# Patient Record
Sex: FEMALE | Race: WHITE | Employment: OTHER | URBAN - METROPOLITAN AREA
[De-identification: names, ages, dates, MRNs, and addresses within clinical notes are randomized per-mention and may not be internally consistent; named-entity substitution may affect disease eponyms.]

---

## 2018-09-10 PROBLEM — M50.30 DDD (DEGENERATIVE DISC DISEASE), CERVICAL: Status: ACTIVE | Noted: 2018-09-10

## 2018-09-10 PROBLEM — M48.02 CERVICAL SPINAL STENOSIS: Status: ACTIVE | Noted: 2018-09-10

## 2018-09-10 PROBLEM — M50.20 HNP (HERNIATED NUCLEUS PULPOSUS), CERVICAL: Status: ACTIVE | Noted: 2018-09-10

## 2018-09-11 ENCOUNTER — HOSPITAL ENCOUNTER (OUTPATIENT)
Dept: PREADMISSION TESTING | Age: 63
Discharge: HOME | End: 2018-09-11
Payer: MEDICARE

## 2018-09-11 VITALS — WEIGHT: 175 LBS | HEIGHT: 67 IN | BODY MASS INDEX: 27.47 KG/M2

## 2018-09-11 LAB
ALBUMIN SERPL-MCNC: 3.6 G/DL (ref 3.4–5)
ALBUMIN/GLOB SERPL: 1.2 {RATIO} (ref 0.8–1.7)
ALP SERPL-CCNC: 55 U/L (ref 45–117)
ALT SERPL-CCNC: 21 U/L (ref 13–56)
ANION GAP SERPL CALC-SCNC: 6 MMOL/L (ref 3–18)
AST SERPL-CCNC: 16 U/L (ref 15–37)
ATRIAL RATE: 83 BPM
BACTERIA SPEC CULT: NORMAL
BILIRUB SERPL-MCNC: 0.3 MG/DL (ref 0.2–1)
BUN SERPL-MCNC: 20 MG/DL (ref 7–18)
BUN/CREAT SERPL: 25 (ref 12–20)
CALCIUM SERPL-MCNC: 9.1 MG/DL (ref 8.5–10.1)
CALCULATED P AXIS, ECG09: 64 DEGREES
CALCULATED R AXIS, ECG10: -13 DEGREES
CALCULATED T AXIS, ECG11: 51 DEGREES
CHLORIDE SERPL-SCNC: 104 MMOL/L (ref 100–108)
CO2 SERPL-SCNC: 32 MMOL/L (ref 21–32)
CREAT SERPL-MCNC: 0.81 MG/DL (ref 0.6–1.3)
DIAGNOSIS, 93000: NORMAL
ERYTHROCYTE [DISTWIDTH] IN BLOOD BY AUTOMATED COUNT: 13.7 % (ref 11.6–14.5)
GLOBULIN SER CALC-MCNC: 3.1 G/DL (ref 2–4)
GLUCOSE SERPL-MCNC: 68 MG/DL (ref 74–99)
HCT VFR BLD AUTO: 41.2 % (ref 35–45)
HGB BLD-MCNC: 13.7 G/DL (ref 12–16)
MCH RBC QN AUTO: 30.8 PG (ref 24–34)
MCHC RBC AUTO-ENTMCNC: 33.3 G/DL (ref 31–37)
MCV RBC AUTO: 92.6 FL (ref 74–97)
P-R INTERVAL, ECG05: 192 MS
PLATELET # BLD AUTO: 229 K/UL (ref 135–420)
PMV BLD AUTO: 10.5 FL (ref 9.2–11.8)
POTASSIUM SERPL-SCNC: 4.2 MMOL/L (ref 3.5–5.5)
PROT SERPL-MCNC: 6.7 G/DL (ref 6.4–8.2)
Q-T INTERVAL, ECG07: 360 MS
QRS DURATION, ECG06: 74 MS
QTC CALCULATION (BEZET), ECG08: 423 MS
RBC # BLD AUTO: 4.45 M/UL (ref 4.2–5.3)
SERVICE CMNT-IMP: NORMAL
SODIUM SERPL-SCNC: 142 MMOL/L (ref 136–145)
VENTRICULAR RATE, ECG03: 83 BPM
WBC # BLD AUTO: 5 K/UL (ref 4.6–13.2)

## 2018-09-11 PROCEDURE — 87641 MR-STAPH DNA AMP PROBE: CPT | Performed by: ORTHOPAEDIC SURGERY

## 2018-09-11 PROCEDURE — 93005 ELECTROCARDIOGRAM TRACING: CPT

## 2018-09-11 PROCEDURE — 80053 COMPREHEN METABOLIC PANEL: CPT | Performed by: ORTHOPAEDIC SURGERY

## 2018-09-11 PROCEDURE — 85027 COMPLETE CBC AUTOMATED: CPT | Performed by: ORTHOPAEDIC SURGERY

## 2018-09-11 RX ORDER — NAPROXEN 500 MG/1
500 TABLET ORAL AS NEEDED
COMMUNITY
End: 2018-09-19

## 2018-09-11 RX ORDER — CEFAZOLIN SODIUM/WATER 2 G/20 ML
2 SYRINGE (ML) INTRAVENOUS ONCE
Status: CANCELLED | OUTPATIENT
Start: 2018-09-11 | End: 2018-09-11

## 2018-09-11 RX ORDER — BISMUTH SUBSALICYLATE 262 MG
1 TABLET,CHEWABLE ORAL DAILY
COMMUNITY

## 2018-09-11 RX ORDER — LEVOTHYROXINE SODIUM 125 UG/1
125 TABLET ORAL
COMMUNITY
End: 2018-09-11

## 2018-09-11 RX ORDER — LEVOTHYROXINE SODIUM 125 UG/1
125 TABLET ORAL
COMMUNITY

## 2018-09-11 RX ORDER — LEVOCETIRIZINE DIHYDROCHLORIDE 5 MG/1
5 TABLET, FILM COATED ORAL DAILY
COMMUNITY

## 2018-09-11 RX ORDER — RIZATRIPTAN BENZOATE 10 MG/1
10 TABLET ORAL
COMMUNITY

## 2018-09-11 NOTE — H&P
Patient Name:  Jahaira Briggs YOB: 1955 Chief Complaint:  Left-sided mid-back pain. History of Chief Complaint:  Ms. Brian Tan is a 57-year-old female who is being seen for left-sided mid-back pain. She has left upper extremity numbness that goes into the left long finger. Her symptoms began suddenly three months ago and have been worse over the past month. The pain is constant. She is right hand dominant. She has decreased left hand  strength and has been dropping items and feels uncoordinated with her left hand. There is no bowel or bladder dysfunction and no problems with her balance. The pain is worse with standing, and it is better with heat, rest, and lying down. She has been wearing a cervical collar for three days per Dr. Les Hayes. She has had four sessions of physical therapy. She has had a steroid injection. She has had two Medrol Dosepaks which helped with her neck pain. She has also taken Mobic, Flexeril, and gabapentin. She had an MRI scan done at [facility] on 08/20/18. She had a DEXA scan done five years ago which was normal.  
 
Past Medical/Surgical History:   
Disease/Disorder Type Date Side Surgery Date Side Comment Arthritis Depression Spinal stenosis Thyroid disease Headache, migraine Cholecystectomy 2000 Allergies:   
Ingredient Reaction Medication Name Comment NO KNOWN ALLERGIES Current Medications:   
Medication Directions  
levothyroxine 125 mcg tablet   
gabapentin   
naproxen 500 mg tablet   
rizatriptan 10 mg tablet Social History: SMOKING Status Tobacco Type Units Per Day Yrs Used Never smoker ALCOHOL There is a history of alcohol use. Type: Wine. 1 glass consumed daily. Family History:   
Disease Detail Family Member Age Cause of Death Comments Family history of Arthritis Family history of Heart disease Review of Systems:    Pertinent positives include headache, muscle weakness and numbness/tingling. Pertinent negatives include blurred vision, chest pain, chills, cold, discharge of the eyes, dizziness, double vision, fever, hearing loss, heart murmur, itching of the eyes, palpitations, redness of the eyes, rheumatic fever, ringing in ears, sore throat/hoarseness, weight change, abdominal pain, anxiety, bipolar disorder, bladder/kidney infection, bloody stool, blood in urine, burning sensation, changes in mood, chronic cough, depression, diarrhea, difficulty breathing, difficulty swallowing, fainting, frequent urinating, fracture/dislocation, gas/bloating, gout, hemorrhoids, incontinence, joint pain, joint stiffness, loss of balance, memory loss, nausea/vomiting, pain on breathing, painful urination, psoriasis, rash/itching, Raynaud's phenomenon, rheumatoid disease, seizure disorder, shortness of breath, sprain/strain, swelling of feet, tendonitis, varicose veins and wheezing. Vitals: 
Date BP Pulse Temp (F) Resp. (per min.) Height (Total in.) Weight (lbs.) BMI  
08/31/2018     66.00 167.00 26.95 Physical Examination:   
General:  The patient appears healthy. Cardiovascular:  Arterial pulses are normal. 
Skin:  The skin is normal appearing with no contusions or wounds noted. Heart- RRR Lungs-CTA kiki Abdomen- +BS,soft,nontender Musculoskeletal:  The cervical spine has a normal appearance, no tenderness to palpation, and no spasm of the paracervical muscle. There is tenderness around the left trapezius muscle. Flexion, extension, and right and left rotation of the cervical spine are normal.  Examination of the shoulder shows no warmth, no deformity, and no muscle atrophy. There is no tenderness on palpation of the subacromial bursa, the glenohumeral joint region, or the bicipital groove.   Range of motion of the shoulder is normal in abduction, forward flexion, extension, and in internal and external rotation. No pain is elicited by motion of the shoulder or by impingement testing. No instability of the shoulder is noted. Neurological:  She has 4/5 left biceps. She has a positive Lhermitte sign. Sensory and motor examination of the cervical spine elicited no tactile dysesthesia or hyperesthesia. Shoulder strength is normal in flexion, abduction, adduction, and internal rotation. Reflexes and peripheral nerves are intact. Normal reflexes are present in the biceps, brachioradialis, and triceps. There is no weakness in the fingers. Gait and stance are normal.  Knee and ankle jerks are normal with no clonus. Radiograph Examination:  AP, lateral, bilateral oblique, flexion and extension views of the cervical spine were obtained and interpreted in the office 8/31/18 and reveal C4 to T1 degenerative disc disease with 1 mm spondylolisthesis of C7 on T1. Review of her MRI scan [facility] 8/20/18 reveals severe spinal stenosis and degenerative disc disease at C4 through C7 with spondylolisthesis of C7 on T1, without neurologic impingement. Plan:  Ms. Dami Urbina, her , and I had a long discussion about the treatments for her cervical spinal stenosis and degenerative disc disease including surgical intervention, the risks, and benefits as well as the different surgical approaches and decision making. We also discussed nonsurgical care for this condition including medications, injections, physical therapy, rehabilitation, activity modification, and brace utilization. At this point, she would like to proceed with operative intervention. We will plan for anterior cervical discectomy and fusion at C4 to C7. We did discuss leaving C7-T1 alone given the minimal listhesis without neurologic impingement. She may need surgical intervention at that level in the future. The risks versus the benefits as well as the alternatives were fully explained to the patient.   Risks include, but are not limited to, paralysis, death, heart attack, stroke, pulmonary embolism, deep vein thrombosis, infection, failure to relieve pain, increase in back or arm pain, reherniation, scarring, spinal fluid leak, bowel or bladder dysfunction, bleeding, disease transmission, instrumentation failure, pseudarthrosis, difficulty swallowing, and need for revision surgery. The patient states full understanding of the risks and benefits and wishes to proceed. Admitting as inpatient acknowledging increased risk of anesthesia and need for post-operative monitoring of  respiratory distress, and  cardiac stress related to conditions with advanced age and multiple level fusion.

## 2018-09-17 ENCOUNTER — ANESTHESIA EVENT (OUTPATIENT)
Dept: SURGERY | Age: 63
End: 2018-09-17
Payer: MEDICARE

## 2018-09-18 ENCOUNTER — APPOINTMENT (OUTPATIENT)
Dept: GENERAL RADIOLOGY | Age: 63
End: 2018-09-18
Attending: ORTHOPAEDIC SURGERY
Payer: MEDICARE

## 2018-09-18 ENCOUNTER — HOSPITAL ENCOUNTER (OUTPATIENT)
Age: 63
Discharge: HOME HEALTH | End: 2018-09-19
Attending: ORTHOPAEDIC SURGERY | Admitting: ORTHOPAEDIC SURGERY
Payer: MEDICARE

## 2018-09-18 ENCOUNTER — ANESTHESIA (OUTPATIENT)
Dept: SURGERY | Age: 63
End: 2018-09-18
Payer: MEDICARE

## 2018-09-18 DIAGNOSIS — M48.02 CERVICAL SPINAL STENOSIS: Primary | ICD-10-CM

## 2018-09-18 DIAGNOSIS — M50.20 HNP (HERNIATED NUCLEUS PULPOSUS), CERVICAL: ICD-10-CM

## 2018-09-18 DIAGNOSIS — M50.30 DDD (DEGENERATIVE DISC DISEASE), CERVICAL: ICD-10-CM

## 2018-09-18 LAB
ABO + RH BLD: NORMAL
BLOOD GROUP ANTIBODIES SERPL: NORMAL
GLUCOSE BLD STRIP.AUTO-MCNC: 136 MG/DL (ref 70–110)
SPECIMEN EXP DATE BLD: NORMAL

## 2018-09-18 PROCEDURE — 94400 HC END TIDAL CO2 RESPONSE CURVE: CPT

## 2018-09-18 PROCEDURE — 77030038020 HC MANFLD NEPTUNE STRY -B: Performed by: ORTHOPAEDIC SURGERY

## 2018-09-18 PROCEDURE — 65390000012 HC CONDITION CODE 44 OBSERVATION

## 2018-09-18 PROCEDURE — 77030020782 HC GWN BAIR PAWS FLX 3M -B: Performed by: ORTHOPAEDIC SURGERY

## 2018-09-18 PROCEDURE — 77030008477 HC STYL SATN SLP COVD -A: Performed by: NURSE ANESTHETIST, CERTIFIED REGISTERED

## 2018-09-18 PROCEDURE — 74011000250 HC RX REV CODE- 250

## 2018-09-18 PROCEDURE — 76010000153 HC OR TIME 1.5 TO 2 HR: Performed by: ORTHOPAEDIC SURGERY

## 2018-09-18 PROCEDURE — 74011250637 HC RX REV CODE- 250/637: Performed by: PHYSICIAN ASSISTANT

## 2018-09-18 PROCEDURE — 77030036881: Performed by: ORTHOPAEDIC SURGERY

## 2018-09-18 PROCEDURE — 86900 BLOOD TYPING SEROLOGIC ABO: CPT | Performed by: ANESTHESIOLOGY

## 2018-09-18 PROCEDURE — 77030002986 HC SUT PROL J&J -A: Performed by: ORTHOPAEDIC SURGERY

## 2018-09-18 PROCEDURE — 77030003029 HC SUT VCRL J&J -B: Performed by: ORTHOPAEDIC SURGERY

## 2018-09-18 PROCEDURE — 77030013567 HC DRN WND RESERV BARD -A: Performed by: ORTHOPAEDIC SURGERY

## 2018-09-18 PROCEDURE — 74011250636 HC RX REV CODE- 250/636: Performed by: PHYSICIAN ASSISTANT

## 2018-09-18 PROCEDURE — 76210000016 HC OR PH I REC 1 TO 1.5 HR: Performed by: ORTHOPAEDIC SURGERY

## 2018-09-18 PROCEDURE — 74011250636 HC RX REV CODE- 250/636: Performed by: ORTHOPAEDIC SURGERY

## 2018-09-18 PROCEDURE — 74011250636 HC RX REV CODE- 250/636

## 2018-09-18 PROCEDURE — 77030008683 HC TU ET CUF COVD -A: Performed by: NURSE ANESTHETIST, CERTIFIED REGISTERED

## 2018-09-18 PROCEDURE — 77030006643: Performed by: NURSE ANESTHETIST, CERTIFIED REGISTERED

## 2018-09-18 PROCEDURE — C1713 ANCHOR/SCREW BN/BN,TIS/BN: HCPCS | Performed by: ORTHOPAEDIC SURGERY

## 2018-09-18 PROCEDURE — 74011250636 HC RX REV CODE- 250/636: Performed by: ANESTHESIOLOGY

## 2018-09-18 PROCEDURE — 77030032490 HC SLV COMPR SCD KNE COVD -B: Performed by: ORTHOPAEDIC SURGERY

## 2018-09-18 PROCEDURE — 77030018836 HC SOL IRR NACL ICUM -A: Performed by: ORTHOPAEDIC SURGERY

## 2018-09-18 PROCEDURE — 77030012406 HC DRN WND PENRS BARD -A: Performed by: ORTHOPAEDIC SURGERY

## 2018-09-18 PROCEDURE — 77030039266 HC ADH SKN EXOFIN S2SG -A: Performed by: ORTHOPAEDIC SURGERY

## 2018-09-18 PROCEDURE — 74011000250 HC RX REV CODE- 250: Performed by: ORTHOPAEDIC SURGERY

## 2018-09-18 PROCEDURE — 82962 GLUCOSE BLOOD TEST: CPT

## 2018-09-18 PROCEDURE — 77010033678 HC OXYGEN DAILY

## 2018-09-18 PROCEDURE — 77030004402 HC BUR NEUR STRY -C: Performed by: ORTHOPAEDIC SURGERY

## 2018-09-18 PROCEDURE — 77030011267 HC ELECTRD BLD COVD -A: Performed by: ORTHOPAEDIC SURGERY

## 2018-09-18 PROCEDURE — 65270000029 HC RM PRIVATE

## 2018-09-18 PROCEDURE — 36415 COLL VENOUS BLD VENIPUNCTURE: CPT | Performed by: ANESTHESIOLOGY

## 2018-09-18 PROCEDURE — 76060000034 HC ANESTHESIA 1.5 TO 2 HR: Performed by: ORTHOPAEDIC SURGERY

## 2018-09-18 PROCEDURE — 77030008462 HC STPLR SKN PROX J&J -A: Performed by: ORTHOPAEDIC SURGERY

## 2018-09-18 DEVICE — IMPLANTABLE DEVICE: Type: IMPLANTABLE DEVICE | Site: SPINE CERVICAL | Status: FUNCTIONAL

## 2018-09-18 DEVICE — GRAFT SPNL SPCR W145XH7XL12MM 7DEG CERV LORDOSIS PRESERVON: Type: IMPLANTABLE DEVICE | Site: SPINE CERVICAL | Status: FUNCTIONAL

## 2018-09-18 DEVICE — GRAFT SPNL SPCR W145XH8XL12MM 7DEG CERV LORDOSIS PRESERVON: Type: IMPLANTABLE DEVICE | Site: SPINE CERVICAL | Status: FUNCTIONAL

## 2018-09-18 DEVICE — SCREW SPNL 4.2X14MM SELF DRL INVUE: Type: IMPLANTABLE DEVICE | Site: SPINE CERVICAL | Status: FUNCTIONAL

## 2018-09-18 RX ORDER — SODIUM CHLORIDE 0.9 % (FLUSH) 0.9 %
5-10 SYRINGE (ML) INJECTION AS NEEDED
Status: DISCONTINUED | OUTPATIENT
Start: 2018-09-18 | End: 2018-09-19 | Stop reason: HOSPADM

## 2018-09-18 RX ORDER — ACETAMINOPHEN 10 MG/ML
1000 INJECTION, SOLUTION INTRAVENOUS ONCE
Status: COMPLETED | OUTPATIENT
Start: 2018-09-18 | End: 2018-09-18

## 2018-09-18 RX ORDER — SODIUM CHLORIDE, SODIUM LACTATE, POTASSIUM CHLORIDE, CALCIUM CHLORIDE 600; 310; 30; 20 MG/100ML; MG/100ML; MG/100ML; MG/100ML
1000 INJECTION, SOLUTION INTRAVENOUS CONTINUOUS
Status: DISCONTINUED | OUTPATIENT
Start: 2018-09-18 | End: 2018-09-18 | Stop reason: HOSPADM

## 2018-09-18 RX ORDER — HYDROMORPHONE HYDROCHLORIDE 2 MG/ML
0.5 INJECTION, SOLUTION INTRAMUSCULAR; INTRAVENOUS; SUBCUTANEOUS
Status: DISCONTINUED | OUTPATIENT
Start: 2018-09-18 | End: 2018-09-18 | Stop reason: HOSPADM

## 2018-09-18 RX ORDER — DEXAMETHASONE SODIUM PHOSPHATE 4 MG/ML
INJECTION, SOLUTION INTRA-ARTICULAR; INTRALESIONAL; INTRAMUSCULAR; INTRAVENOUS; SOFT TISSUE AS NEEDED
Status: DISCONTINUED | OUTPATIENT
Start: 2018-09-18 | End: 2018-09-18 | Stop reason: HOSPADM

## 2018-09-18 RX ORDER — RIZATRIPTAN BENZOATE 5 MG/1
10 TABLET, ORALLY DISINTEGRATING ORAL
Status: DISCONTINUED | OUTPATIENT
Start: 2018-09-18 | End: 2018-09-19 | Stop reason: HOSPADM

## 2018-09-18 RX ORDER — DIPHENHYDRAMINE HYDROCHLORIDE 50 MG/ML
12.5 INJECTION, SOLUTION INTRAMUSCULAR; INTRAVENOUS
Status: DISCONTINUED | OUTPATIENT
Start: 2018-09-18 | End: 2018-09-19 | Stop reason: HOSPADM

## 2018-09-18 RX ORDER — PROPOFOL 10 MG/ML
INJECTION, EMULSION INTRAVENOUS AS NEEDED
Status: SHIPPED | OUTPATIENT
Start: 2018-09-18

## 2018-09-18 RX ORDER — ONDANSETRON 2 MG/ML
4 INJECTION INTRAMUSCULAR; INTRAVENOUS ONCE
Status: COMPLETED | OUTPATIENT
Start: 2018-09-18 | End: 2018-09-18

## 2018-09-18 RX ORDER — CEFAZOLIN SODIUM 2 G/50ML
2 SOLUTION INTRAVENOUS ONCE
Status: COMPLETED | OUTPATIENT
Start: 2018-09-18 | End: 2018-09-18

## 2018-09-18 RX ORDER — DOCUSATE SODIUM 100 MG/1
100 CAPSULE, LIQUID FILLED ORAL 2 TIMES DAILY
Status: DISCONTINUED | OUTPATIENT
Start: 2018-09-18 | End: 2018-09-19 | Stop reason: HOSPADM

## 2018-09-18 RX ORDER — INSULIN LISPRO 100 [IU]/ML
INJECTION, SOLUTION INTRAVENOUS; SUBCUTANEOUS ONCE
Status: DISCONTINUED | OUTPATIENT
Start: 2018-09-18 | End: 2018-09-18 | Stop reason: HOSPADM

## 2018-09-18 RX ORDER — EPHEDRINE SULFATE/0.9% NACL/PF 25 MG/5 ML
SYRINGE (ML) INTRAVENOUS AS NEEDED
Status: DISCONTINUED | OUTPATIENT
Start: 2018-09-18 | End: 2018-09-18

## 2018-09-18 RX ORDER — SODIUM CHLORIDE 0.9 % (FLUSH) 0.9 %
5-10 SYRINGE (ML) INJECTION EVERY 8 HOURS
Status: DISCONTINUED | OUTPATIENT
Start: 2018-09-18 | End: 2018-09-19 | Stop reason: HOSPADM

## 2018-09-18 RX ORDER — MIDAZOLAM HYDROCHLORIDE 1 MG/ML
INJECTION, SOLUTION INTRAMUSCULAR; INTRAVENOUS AS NEEDED
Status: DISCONTINUED | OUTPATIENT
Start: 2018-09-18 | End: 2018-09-18 | Stop reason: HOSPADM

## 2018-09-18 RX ORDER — NALOXONE HYDROCHLORIDE 0.4 MG/ML
0.1 INJECTION, SOLUTION INTRAMUSCULAR; INTRAVENOUS; SUBCUTANEOUS AS NEEDED
Status: DISCONTINUED | OUTPATIENT
Start: 2018-09-18 | End: 2018-09-18 | Stop reason: HOSPADM

## 2018-09-18 RX ORDER — SODIUM CHLORIDE 0.9 % (FLUSH) 0.9 %
5-10 SYRINGE (ML) INJECTION AS NEEDED
Status: DISCONTINUED | OUTPATIENT
Start: 2018-09-18 | End: 2018-09-18 | Stop reason: HOSPADM

## 2018-09-18 RX ORDER — DEXTROSE 50 % IN WATER (D50W) INTRAVENOUS SYRINGE
25-50 AS NEEDED
Status: DISCONTINUED | OUTPATIENT
Start: 2018-09-18 | End: 2018-09-18 | Stop reason: HOSPADM

## 2018-09-18 RX ORDER — LEVOTHYROXINE SODIUM 125 UG/1
125 TABLET ORAL
Status: DISCONTINUED | OUTPATIENT
Start: 2018-09-19 | End: 2018-09-19 | Stop reason: HOSPADM

## 2018-09-18 RX ORDER — KETAMINE HYDROCHLORIDE 10 MG/ML
INJECTION, SOLUTION INTRAMUSCULAR; INTRAVENOUS AS NEEDED
Status: DISCONTINUED | OUTPATIENT
Start: 2018-09-18 | End: 2018-09-18 | Stop reason: HOSPADM

## 2018-09-18 RX ORDER — DIAZEPAM 5 MG/1
2.5 TABLET ORAL
Status: DISCONTINUED | OUTPATIENT
Start: 2018-09-18 | End: 2018-09-19 | Stop reason: HOSPADM

## 2018-09-18 RX ORDER — OXYCODONE AND ACETAMINOPHEN 5; 325 MG/1; MG/1
1 TABLET ORAL
Status: DISCONTINUED | OUTPATIENT
Start: 2018-09-18 | End: 2018-09-19 | Stop reason: HOSPADM

## 2018-09-18 RX ORDER — HYDROMORPHONE HYDROCHLORIDE 2 MG/ML
INJECTION, SOLUTION INTRAMUSCULAR; INTRAVENOUS; SUBCUTANEOUS AS NEEDED
Status: DISCONTINUED | OUTPATIENT
Start: 2018-09-18 | End: 2018-09-18 | Stop reason: HOSPADM

## 2018-09-18 RX ORDER — ROCURONIUM BROMIDE 10 MG/ML
INJECTION, SOLUTION INTRAVENOUS AS NEEDED
Status: DISCONTINUED | OUTPATIENT
Start: 2018-09-18 | End: 2018-09-18 | Stop reason: HOSPADM

## 2018-09-18 RX ORDER — OXYCODONE AND ACETAMINOPHEN 7.5; 325 MG/1; MG/1
1 TABLET ORAL
Status: DISCONTINUED | OUTPATIENT
Start: 2018-09-18 | End: 2018-09-19 | Stop reason: HOSPADM

## 2018-09-18 RX ORDER — ONDANSETRON 4 MG/1
4 TABLET, ORALLY DISINTEGRATING ORAL
Status: DISCONTINUED | OUTPATIENT
Start: 2018-09-18 | End: 2018-09-19

## 2018-09-18 RX ORDER — NALOXONE HYDROCHLORIDE 0.4 MG/ML
0.1 INJECTION, SOLUTION INTRAMUSCULAR; INTRAVENOUS; SUBCUTANEOUS AS NEEDED
Status: DISCONTINUED | OUTPATIENT
Start: 2018-09-18 | End: 2018-09-19 | Stop reason: HOSPADM

## 2018-09-18 RX ORDER — ACETAMINOPHEN 500 MG
1000 TABLET ORAL
COMMUNITY
End: 2018-09-19

## 2018-09-18 RX ORDER — FLUMAZENIL 0.1 MG/ML
0.2 INJECTION INTRAVENOUS
Status: DISCONTINUED | OUTPATIENT
Start: 2018-09-18 | End: 2018-09-18 | Stop reason: HOSPADM

## 2018-09-18 RX ORDER — EPHEDRINE SULFATE/0.9% NACL/PF 25 MG/5 ML
SYRINGE (ML) INTRAVENOUS AS NEEDED
Status: DISCONTINUED | OUTPATIENT
Start: 2018-09-18 | End: 2018-09-18 | Stop reason: HOSPADM

## 2018-09-18 RX ORDER — ONDANSETRON 2 MG/ML
4 INJECTION INTRAMUSCULAR; INTRAVENOUS
Status: DISCONTINUED | OUTPATIENT
Start: 2018-09-18 | End: 2018-09-19 | Stop reason: HOSPADM

## 2018-09-18 RX ORDER — SODIUM CHLORIDE, SODIUM LACTATE, POTASSIUM CHLORIDE, CALCIUM CHLORIDE 600; 310; 30; 20 MG/100ML; MG/100ML; MG/100ML; MG/100ML
125 INJECTION, SOLUTION INTRAVENOUS CONTINUOUS
Status: DISCONTINUED | OUTPATIENT
Start: 2018-09-18 | End: 2018-09-19 | Stop reason: HOSPADM

## 2018-09-18 RX ORDER — ACETAMINOPHEN 325 MG/1
650 TABLET ORAL
Status: DISCONTINUED | OUTPATIENT
Start: 2018-09-18 | End: 2018-09-19 | Stop reason: HOSPADM

## 2018-09-18 RX ORDER — MAGNESIUM SULFATE 100 %
4 CRYSTALS MISCELLANEOUS AS NEEDED
Status: DISCONTINUED | OUTPATIENT
Start: 2018-09-18 | End: 2018-09-18 | Stop reason: HOSPADM

## 2018-09-18 RX ORDER — CEFAZOLIN SODIUM 2 G/50ML
2 SOLUTION INTRAVENOUS EVERY 8 HOURS
Status: COMPLETED | OUTPATIENT
Start: 2018-09-18 | End: 2018-09-19

## 2018-09-18 RX ORDER — ONDANSETRON 2 MG/ML
INJECTION INTRAMUSCULAR; INTRAVENOUS AS NEEDED
Status: DISCONTINUED | OUTPATIENT
Start: 2018-09-18 | End: 2018-09-18 | Stop reason: HOSPADM

## 2018-09-18 RX ADMIN — ROCURONIUM BROMIDE 30 MG: 10 INJECTION, SOLUTION INTRAVENOUS at 12:57

## 2018-09-18 RX ADMIN — MIDAZOLAM HYDROCHLORIDE 2 MG: 1 INJECTION, SOLUTION INTRAMUSCULAR; INTRAVENOUS at 12:43

## 2018-09-18 RX ADMIN — ROCURONIUM BROMIDE 10 MG: 10 INJECTION, SOLUTION INTRAVENOUS at 13:40

## 2018-09-18 RX ADMIN — KETAMINE HYDROCHLORIDE 10 MG: 10 INJECTION, SOLUTION INTRAMUSCULAR; INTRAVENOUS at 13:27

## 2018-09-18 RX ADMIN — HYDROMORPHONE HYDROCHLORIDE 0.5 MG: 2 INJECTION, SOLUTION INTRAMUSCULAR; INTRAVENOUS; SUBCUTANEOUS at 14:31

## 2018-09-18 RX ADMIN — ROCURONIUM BROMIDE 10 MG: 10 INJECTION, SOLUTION INTRAVENOUS at 13:27

## 2018-09-18 RX ADMIN — HYDROMORPHONE HYDROCHLORIDE 0.5 MG: 2 INJECTION, SOLUTION INTRAMUSCULAR; INTRAVENOUS; SUBCUTANEOUS at 14:48

## 2018-09-18 RX ADMIN — Medication: at 15:23

## 2018-09-18 RX ADMIN — CEFAZOLIN SODIUM 2 G: 2 SOLUTION INTRAVENOUS at 13:14

## 2018-09-18 RX ADMIN — ROCURONIUM BROMIDE 10 MG: 10 INJECTION, SOLUTION INTRAVENOUS at 13:53

## 2018-09-18 RX ADMIN — KETAMINE HYDROCHLORIDE 10 MG: 10 INJECTION, SOLUTION INTRAMUSCULAR; INTRAVENOUS at 12:53

## 2018-09-18 RX ADMIN — DOCUSATE SODIUM 100 MG: 100 CAPSULE, LIQUID FILLED ORAL at 23:16

## 2018-09-18 RX ADMIN — DEXAMETHASONE SODIUM PHOSPHATE 8 MG: 4 INJECTION, SOLUTION INTRA-ARTICULAR; INTRALESIONAL; INTRAMUSCULAR; INTRAVENOUS; SOFT TISSUE at 14:24

## 2018-09-18 RX ADMIN — KETAMINE HYDROCHLORIDE 30 MG: 10 INJECTION, SOLUTION INTRAMUSCULAR; INTRAVENOUS at 12:57

## 2018-09-18 RX ADMIN — SODIUM CHLORIDE, SODIUM LACTATE, POTASSIUM CHLORIDE, AND CALCIUM CHLORIDE 125 ML/HR: 600; 310; 30; 20 INJECTION, SOLUTION INTRAVENOUS at 10:45

## 2018-09-18 RX ADMIN — ONDANSETRON 4 MG: 2 INJECTION INTRAMUSCULAR; INTRAVENOUS at 17:32

## 2018-09-18 RX ADMIN — Medication 10 MG: at 14:03

## 2018-09-18 RX ADMIN — ACETAMINOPHEN 1000 MG: 10 INJECTION, SOLUTION INTRAVENOUS at 13:23

## 2018-09-18 RX ADMIN — SODIUM CHLORIDE, SODIUM LACTATE, POTASSIUM CHLORIDE, AND CALCIUM CHLORIDE 125 ML/HR: 600; 310; 30; 20 INJECTION, SOLUTION INTRAVENOUS at 19:43

## 2018-09-18 RX ADMIN — SODIUM CHLORIDE, SODIUM LACTATE, POTASSIUM CHLORIDE, AND CALCIUM CHLORIDE 125 ML/HR: 600; 310; 30; 20 INJECTION, SOLUTION INTRAVENOUS at 23:28

## 2018-09-18 RX ADMIN — Medication 10 ML: at 23:16

## 2018-09-18 RX ADMIN — ONDANSETRON 4 MG: 2 INJECTION INTRAMUSCULAR; INTRAVENOUS at 15:58

## 2018-09-18 RX ADMIN — HYDROMORPHONE HYDROCHLORIDE 0.5 MG: 2 INJECTION, SOLUTION INTRAMUSCULAR; INTRAVENOUS; SUBCUTANEOUS at 14:20

## 2018-09-18 RX ADMIN — PROPOFOL 50 MG: 10 INJECTION, EMULSION INTRAVENOUS at 13:00

## 2018-09-18 RX ADMIN — Medication: at 19:51

## 2018-09-18 RX ADMIN — PROPOFOL 100 MG: 10 INJECTION, EMULSION INTRAVENOUS at 12:57

## 2018-09-18 RX ADMIN — SODIUM CHLORIDE, SODIUM LACTATE, POTASSIUM CHLORIDE, AND CALCIUM CHLORIDE: 600; 310; 30; 20 INJECTION, SOLUTION INTRAVENOUS at 14:28

## 2018-09-18 RX ADMIN — ONDANSETRON 4 MG: 2 INJECTION INTRAMUSCULAR; INTRAVENOUS at 23:16

## 2018-09-18 RX ADMIN — CEFAZOLIN SODIUM 2 G: 2 SOLUTION INTRAVENOUS at 19:44

## 2018-09-18 RX ADMIN — ONDANSETRON 4 MG: 2 INJECTION INTRAMUSCULAR; INTRAVENOUS at 14:24

## 2018-09-18 RX ADMIN — NALOXONE HYDROCHLORIDE 0.1 MG: 0.4 INJECTION, SOLUTION INTRAMUSCULAR; INTRAVENOUS; SUBCUTANEOUS at 17:04

## 2018-09-18 RX ADMIN — NALOXONE HYDROCHLORIDE 0.1 MG: 0.4 INJECTION, SOLUTION INTRAMUSCULAR; INTRAVENOUS; SUBCUTANEOUS at 17:09

## 2018-09-18 NOTE — ANESTHESIA POSTPROCEDURE EVALUATION
Post-Anesthesia Evaluation and Assessment Cardiovascular Function/Vital Signs Visit Vitals  /64  Pulse 67  Temp 36.2 °C (97.2 °F)  Resp 13  Ht 5' 7\" (1.702 m)  Wt 78.5 kg (173 lb 2 oz)  SpO2 98%  BMI 27.12 kg/m2 Patient is status post Procedure(s): 
C4-7  ANTERIOR CERVICAL DISC FUSION W/C-ARM. Nausea/Vomiting: Controlled. Postoperative hydration reviewed and adequate. Pain: 
Pain Scale 1: FLACC (09/18/18 1530) Pain Intensity 1: 0 (09/18/18 1530) Managed. Neurological Status:  
Neuro (WDL): Exceptions to WDL (09/18/18 1530) At baseline. Mental Status and Level of Consciousness: Arousable. Pulmonary Status:  
O2 Device: Nasal cannula (09/18/18 1530) Adequate oxygenation and airway patent. Complications related to anesthesia: None Post-anesthesia assessment completed. No concerns. Patient has met all discharge requirements. Signed By: Bear Severino CRNA September 18, 2018

## 2018-09-18 NOTE — PERIOP NOTES
Reviewed PTA medication list with patient/caregiver and patient/caregiver denies any additional medications.

## 2018-09-18 NOTE — INTERVAL H&P NOTE
H&P Update: 
Lesly Jiménez was seen and examined. History and physical has been reviewed. The patient has been examined.  There have been no significant clinical changes since the completion of the originally dated History and Physical. 
 
Signed By: Evin Ospina MD   
 September 18, 2018 11:45 AM

## 2018-09-18 NOTE — ANESTHESIA PREPROCEDURE EVALUATION
Anesthetic History No history of anesthetic complications Review of Systems / Medical History Patient summary reviewed, nursing notes reviewed and pertinent labs reviewed Pulmonary Within defined limits Neuro/Psych Within defined limits Cardiovascular Exercise tolerance: >4 METS 
  
GI/Hepatic/Renal 
Within defined limits Endo/Other Hypothyroidism: well controlled Arthritis Other Findings Physical Exam 
 
Airway Mallampati: II 
TM Distance: 4 - 6 cm Neck ROM: decreased range of motion Mouth opening: Normal 
 
 Cardiovascular Rhythm: regular Rate: normal 
 
 
 
 Dental 
No notable dental hx Pulmonary Breath sounds clear to auscultation Abdominal 
GI exam deferred Other Findings Anesthetic Plan ASA: 2 Anesthesia type: general 
 
 
 
 
Induction: Intravenous Anesthetic plan and risks discussed with: Patient

## 2018-09-18 NOTE — IP AVS SNAPSHOT
[address] [place] [id number] 
[phone] Patient: Trenton Rodas MRN: [id number] RCN:0/1/2015 A check kristen indicates which time of day the medication should be taken. My Medications START taking these medications Instructions Each Dose to Equal  
 Morning Noon Evening Bedtime  
 diazePAM 5 mg tablet Commonly known as:  VALIUM Your last dose was: Your next dose is: Take 0.5-1 Tabs by mouth three (3) times daily as needed. Max Daily Amount: 15 mg.  
 2.5-5 mg  
    
   
   
   
  
 oxyCODONE-acetaminophen 5-325 mg per tablet Commonly known as:  PERCOCET Your last dose was: Your next dose is: Take 1-1.5 Tabs by mouth every four (4) hours as needed. Max Daily Amount: 9 Tabs.  
 1-1.5 Tab CONTINUE taking these medications Instructions Each Dose to Equal  
 Morning Noon Evening Bedtime CALCIUM 600 + D 600-125 mg-unit Tab Generic drug:  calcium-cholecalciferol (d3) Your last dose was: Your next dose is: Take  by mouth daily. CHILDREN'S FLONASE SENSIMIST 27.5 mcg/actuation nasal spray Generic drug:  fluticasone Your last dose was: Your next dose is:    
   
   
 1 [place] by Nasal route daily. Indications: Allergic Rhinitis 1 Spray  
    
   
   
   
  
 levothyroxine 125 mcg tablet Commonly known as:  SYNTHROID Your last dose was: Your next dose is: Take 125 mcg by mouth Daily (before breakfast). Indications: hypothyroidism 125 mcg MAXALT 10 mg tablet Generic drug:  rizatriptan Your last dose was: Your next dose is: Take 10 mg by mouth once as needed for Migraine. May repeat in 2 hours if needed 10 mg  
    
   
   
   
  
 multivitamin tablet Commonly known as:  ONE A DAY  
   
 Your last dose was: Your next dose is: Take 1 Tab by mouth daily. 1 Tab XYZAL 5 mg tablet Generic drug:  levocetirizine Your last dose was: Your next dose is: Take 5 mg by mouth daily. Indications: Allergic Rhinitis 5 mg STOP taking these medications   
 acetaminophen 500 mg tablet Commonly known as:  TYLENOL  
   
  
 naproxen 500 mg tablet Commonly known as:  NAPROSYN  
   
  
  
ASK your doctor about these medications Instructions Each Dose to Equal  
 Morning Noon Evening Bedtime  
 naloxone 2 mg/0.4 mL auto-injector Commonly known as:  Shantanu Phipps Ask about: Should I take this medication? Your last dose was: Your next dose is: 0.4 mL by IntraMUSCular route once as needed for Overdose for up to 1 dose. 2 mg Where to Get Your Medications These medications were sent to [address] [place] [address] [address] Hours:  M-F 930am-6pm Phone:  [phone]  
  naloxone 2 mg/0.4 mL auto-injector Information on where to get these meds will be given to you by the nurse or doctor. ! Ask your nurse or doctor about these medications  
  diazePAM 5 mg tablet  
 oxyCODONE-acetaminophen 5-325 mg per tablet

## 2018-09-18 NOTE — PERIOP NOTES
Patient arrived to PACU at [phone] Report received from VICENTA Ronquillo and Kelsey Matos RN regarding patient . Surgeon(s):Shoshana and Procedure(s): verified3 Confirmed with allergies and dressings discussed. Anesthesia type, drugs, patient history, complications, estimated blood loss, vital signs, intake and output, and last pain medication, lines, reversal medications and temperature were reviewed. PACU monitoring initiated. See doc flow sheet for detailed physical assessment.

## 2018-09-18 NOTE — PROGRESS NOTES
1630 
Assumed care at this time. Assessment completed in flowsheet. Pt is very drowsy. Denies SOB and chest pain. Pt lungs clear bilaterally. Capillary refill less than 3 seconds. Pt has numbness to LUE. Stated pain  0/10. Pt has 18G IV to the RT AC. Pt has 4x4 dressing with nonadherent dressing and a cervical collar. SCDs applied bilaterally. Pt encouraged to continue use of IS, Ice pack applied. Call light and possessions within reach. Bed is in the lowest position. Will continue to monitor. 1640- [facility] [address] (medic) checked out pt to make sure she was stable 1706 - Respiratory rate 4. 0.1 mg Narcan administered. 1710 -RR 6. Admit 0.1 mg Narcan 1713-pt vomited [address] called back and said to order zofran IV 4mg q4 
1732-zofran admit, family at bedside 2030- pt vomited

## 2018-09-18 NOTE — PERIOP NOTES
TRANSFER - OUT REPORT: 
 
Verbal report given to Enma COHEN(name) on [place]  being transferred to [address] [place] [address](unit) for routine post - op Report consisted of patients Situation, Background, Assessment and  
Recommendations(SBAR). Information from the following report(s) Procedure Summary, Intake/Output and MAR was reviewed with the receiving nurse. Lines:  
Peripheral IV 09/18/18 Right Antecubital (Active) Site Assessment Clean, dry, & intact 9/18/2018  3:40 PM  
Phlebitis Assessment 0 9/18/2018  3:40 PM  
Infiltration Assessment 0 9/18/2018  3:40 PM  
Dressing Status Clean, dry, & intact 9/18/2018  3:40 PM  
Dressing Type Tape;Transparent 9/18/2018  3:40 PM  
Hub Color/Line Status Infusing 9/18/2018  3:40 PM  
Alcohol Cap Used No 9/18/2018 10:43 AM  
  
 
Opportunity for questions and clarification was provided. Patient transported with: 
 Registered Nurse Tech  
monitor

## 2018-09-18 NOTE — PROGRESS NOTES
Setup Etco2 monitor,2L NC,HR 64 resp rate 10,Etco2 39,02 sats 98%,pt cold. clamy resp rate dropping,vomting,RN gave narcan

## 2018-09-18 NOTE — PROGRESS NOTES
Orders received. Chart reviewed. PT assessment not performed at this time as pt's RR assessed at 4 and receiving  Narcan at this time. Will f/u with pt once medically stabled and pt's schedule allows.

## 2018-09-18 NOTE — IP AVS SNAPSHOT
[address] [address] 
[phone] Patient: Tamika Iglesias MRN: [id number] LQN:5[id number] About your hospitalization You were admitted on:  September 18, 2018 You last received care in the:  THE [facility] [address] You were discharged on:  September 19, 2018 Why you were hospitalized Your primary diagnosis was:  Cervical Spinal Stenosis Your diagnoses also included:  Ddd (Degenerative Disc Disease), Cervical, Hnp (Herniated Nucleus Pulposus), Cervical, Cervical Spinal Stenosis Follow-up Information Follow up With Details Comments Contact Info Hakeem Khanna MD On 10/1/2018 Follow up appointment @ 1:00pm [facility] and [address] [place] [address] 
[phone] Tex Aase, MD   [address],[place] [address] 
[phone] [address] [place] [address] to continue managing your healthcare needs. [phone] Discharge Orders None A check kristen indicates which time of day the medication should be taken. My Medications START taking these medications Instructions Each Dose to Equal  
 Morning Noon Evening Bedtime  
 diazePAM 5 mg tablet Commonly known as:  VALIUM Your last dose was: Your next dose is: Take 0.5-1 Tabs by mouth three (3) times daily as needed. Max Daily Amount: 15 mg.  
 2.5-5 mg  
    
   
   
   
  
 oxyCODONE-acetaminophen 5-325 mg per tablet Commonly known as:  PERCOCET Your last dose was: Your next dose is: Take 1-1.5 Tabs by mouth every four (4) hours as needed. Max Daily Amount: 9 Tabs.  
 1-1.5 Tab CONTINUE taking these medications Instructions Each Dose to Equal  
 Morning Noon Evening Bedtime CALCIUM 600 + D 600-125 mg-unit Tab Generic drug:  calcium-cholecalciferol (d3) Your last dose was: Your next dose is: Take  by mouth daily. CHILDREN'S FLONASE SENSIMIST 27.5 mcg/actuation nasal spray Generic drug:  fluticasone Your last dose was: Your next dose is:    
   
   
 1 Rogers by Nasal route daily. Indications: Allergic Rhinitis 1 Spray  
    
   
   
   
  
 levothyroxine 125 mcg tablet Commonly known as:  SYNTHROID Your last dose was: Your next dose is: Take 125 mcg by mouth Daily (before breakfast). Indications: hypothyroidism 125 mcg MAXALT 10 mg tablet Generic drug:  rizatriptan Your last dose was: Your next dose is: Take 10 mg by mouth once as needed for Migraine. May repeat in 2 hours if needed 10 mg  
    
   
   
   
  
 multivitamin tablet Commonly known as:  ONE A DAY Your last dose was: Your next dose is: Take 1 Tab by mouth daily. 1 Tab XYZAL 5 mg tablet Generic drug:  levocetirizine Your last dose was: Your next dose is: Take 5 mg by mouth daily. Indications: Allergic Rhinitis 5 mg STOP taking these medications   
 acetaminophen 500 mg tablet Commonly known as:  TYLENOL  
   
  
 naproxen 500 mg tablet Commonly known as:  NAPROSYN  
   
  
  
ASK your doctor about these medications Instructions Each Dose to Equal  
 Morning Noon Evening Bedtime  
 naloxone 2 mg/0.4 mL auto-injector Commonly known as:  Abel Thao Ask about: Should I take this medication? Your last dose was: Your next dose is: 0.4 mL by IntraMUSCular route once as needed for Overdose for up to 1 dose. 2 mg Where to Get Your Medications [facility] [address]  [address], [address] Hours:  M-F 930am-6pm Phone:  [phone]  
  naloxone 2 mg/0.4 mL auto-injector Information on where to get these meds will be given to you by the nurse or doctor. ! Ask your nurse or doctor about these medications  
  diazePAM 5 mg tablet  
 oxyCODONE-acetaminophen 5-325 mg per tablet Opioid Education Prescription Opioids: What You Need to Know: 
 
Prescription opioids can be used to help relieve moderate-to-severe pain and are often prescribed following a surgery or injury, or for certain health conditions. These medications can be an important part of treatment but also come with serious risks. Opioids are strong pain medicines. Examples include hydrocodone, oxycodone, fentanyl, and morphine. Heroin is an example of an illegal opioid. It is important to work with your health care provider to make sure you are getting the safest, most effective care. WHAT ARE THE RISKS AND SIDE EFFECTS OF OPIOID USE? Prescription opioids carry serious risks of addiction and overdose, especially with prolonged use. An opioid overdose, often marked by slow breathing, can cause sudden death. The use of prescription opioids can have a number of side effects as well, even when taken as directed. · Tolerance-meaning you might need to take more of a medication for the same pain relief · Physical dependence-meaning you have symptoms of withdrawal when the medication is stopped. Withdrawal symptoms can include nausea, sweating, chills, diarrhea, stomach cramps, and muscle aches. Withdrawal can last up to several weeks, depending on which drug you took and how long you took it. · Increased sensitivity to pain · Constipation · Nausea, vomiting, and dry mouth · Sleepiness and dizziness · Confusion · Depression · Low levels of testosterone that can result in lower sex drive, energy, and strength · Itching and sweating RISKS ARE GREATER WITH:      
· History of drug misuse, substance use disorder, or overdose · Mental health conditions (such as depression or anxiety) · Sleep apnea · Older age (72 years or older) · Pregnancy Avoid alcohol while taking prescription opioids. Also, unless specifically advised by your health care provider, medications to avoid include: · Benzodiazepines (such as Xanax or Valium) · Muscle relaxants (such as Soma or Flexeril) · Hypnotics (such as Ambien or Lunesta) · Other prescription opioids KNOW YOUR OPTIONS Talk to your health care provider about ways to manage your pain that don't involve prescription opioids. Some of these options may actually work better and have fewer risks and side effects. Consult your physician before adding or stopping any medications, treatments, or physical activity. Options may include: 
· Pain relievers such as acetaminophen, ibuprofen, and naproxen · Some medications that are also used for depression or seizures · Physical therapy and exercise · Counseling to help patients learn how to cope better with triggers of pain and stress. · Application of heat or cold compress · Massage therapy · Relaxation techniques Be Informed Make sure you know the name of your medication, how much and how often to take it, and its potential risks & side effects. IF YOU ARE PRESCRIBED OPIOIDS FOR PAIN: 
· Never take opioids in greater amounts or more often than prescribed. Remember the goal is not to be pain-free but to manage your pain at a tolerable level. · Follow up with your primary care provider to: · Work together to create a plan on how to manage your pain. · Talk about ways to help manage your pain that don't involve prescription opioids. · Talk about any and all concerns and side effects. · Help prevent misuse and abuse. · Never sell or share prescription opioids · Help prevent misuse and abuse. · Store prescription opioids in a secure place and out of reach of others (this may include visitors, children, friends, and family). · Safely dispose of unused/unwanted prescription opioids: Find your community drug take-back program or your pharmacy mail-back program, or flush them down the toilet, following guidance from the Food and Drug Administration (www.fda.gov/Drugs/ResourcesForYou). · Visit www.cdc.gov/drugoverdose to learn about the risks of opioid abuse and overdose. · If you believe you may be struggling with addiction, tell your health care provider and ask for guidance or call [address] [place] [address] at [phone]. Discharge Instructions Dr. [facility] [employer] Operative Instructions: Cervical Fusion *YOU MUST AVOID SMOKING OR BEING AROUND ANYONE WHO SMOKES. AVOID ALL PRODUCTS THAT CONTAIN NICOTINE. DO NOT TAKE IBUPROFEN OR ANTI--INFLAMMATORIES, AS THESE MAY ALTER THE HEALING OF THE FUSION. Diet: 1. Begin with liquids and light foods such as jell-o or soups 2. Advance as tolerated to your regular diet if not nauseated. 3.  Swallowing difficulties may be experienced up to 2 weeks post-operatively. Modify food thickness as necessary. You may also obtain over-the-counter chloraseptic spray. Medications: 1. Strong oral narcotic pain medications have been prescribed for the first few days. Use only as directed. No pain medication is capable of taking away all the pain. Taking your pills at regular intervals will give you the best chance of having less pain. 2. If you need a refill PLEASE PLAN AHEAD. Call our office during regular hours (8-5). 3. Do not combine with alcoholic beverages. 4. Be careful as you walk, climb stairs or drive as mild dizziness is not unusual. 
5. Do not take medications that have not been prescribed by your surgeon. 6. You may switch to over the counter pain medication of your choice as you become more comfortable. Activity and Restrictions: 
1. You should not drive until you are clear by your surgeon at your post-operative visit. 2.  In regards to your cervical collar, you MUST wear this at all times until your first follow-up appointment. 3.  You should wear the collar even when sleeping. 4.  It can be removed for short periods of time as long as you are keeping your head centered over the shoulders without rotating or looking up or down. 5. You may take short walks inside and outside of your home and climb stairs. 6. You are to avoid work, housework, yard work, snow shoveling, lifting of more than a few pounds, overhead work or strenuous activity. 7. Avoid any excessive stretching or range-of-motion of the neck. Non-painful range-of-motion of the neck is allowed 8. Follow-up with Dr. Kieran Miller in 7-10 days. DRIVIN. You should not drive until after your follow-up appointment. 2.  You can be in a vehicle for short distances, but if you travel any long distance, please stop about every 30 minutes and walk/stretch. 3.  You should NEVER drive while taking narcotic medication. BATHING and INCISION CARE: 
1. The incision may be tender to touch or feel numb: this is normal.  
2.  Keep the incision clean and dry. Do not get the incision wet for 5 days. The incision will be closed with sutures under the skin and the skin will be glued. 3.  Do not apply any lotions, ointments or oils on the incision. 4.  If you notice any excessive swelling, redness, or persistent drainage around the incision, notify the office immediately. RETURN TO WORK : 
1. The decision to return to work will be determined on an individual basis. 2.  Many people who have a strenuous job (construction, heavy labor, etc) may need to be off work for up to 8 weeks. 3.  If you need a work note, please let us know as soon as possible, and not the same day you are planning to return to work. NUTRITION : 
1.  Good nutrition is an essential part of healing. 2.  You should eat a balanced diet each day, including fruits, vegetables, dairy products and protein. 3.  Remember to drink plenty of water. 4.  If you have not had a bowel movement within 3 days of surgery, you will need to use a laxative or suppository that can be obtained over-the-counter at your local pharmacy. BONE STIMULATOR: 
1. A spinal bone stimulator may be prescribed for you under certain situations. 2.  A nurse will call you if one has been requested and discuss its use for approximately 4-6 months post-op every day. 3.  This device assists in bone healing and fusion. CALL THE OFFICE: 
? If you have severe pain unrelieved by the medications, new numbness or tingling in your arms; 
? If you have a fever of 101.0°F or greater;  
? If you notice excessive swelling, redness, or persistent drainage from the incision or IV site; The [facility] office number is [phone] from 8:00am to 5:00pm Monday through Friday. After 5:00pm, on weekends, or holidays, please leave a message with our answering service and the doctor on-call will get back to you shortly. Introducing [facility]! Dear Ramses Edwards: Thank you for requesting a [employer] account. Our records indicate that you already have an active [employer] account. You can access your account anytime at [email or web address] Did you know that you can access your hospital and ER discharge instructions at any time in [employer]? You can also review all of your test results from your hospital stay or ER visit. Additional Information If you have questions, please visit the Frequently Asked Questions section of the [employer] website at [email or web address] Remember, MyChart is NOT to be used for urgent needs. For medical emergencies, dial 911. Now available from your iPhone and Android! Introducing Neal Payne As a [facility] patient, I wanted to make you aware of our electronic visit tool called Neal Payne. [facility] 24/7 allows you to connect within minutes with a medical provider 24 hours a day, seven days a week via a mobile device or tablet or logging into a secure website from your computer. You can access Neal Payne from anywhere in the [place]. A virtual visit might be right for you when you have a simple condition and feel like you just dont want to get out of bed, or cant get away from work for an appointment, when your regular [facility] provider is not available (evenings, weekends or holidays), or when youre out of town and need minor care. Electronic visits cost only $49 and if the [facility] 24/7 provider determines a prescription is needed to treat your condition, one can be electronically transmitted to a nearby pharmacy*. Please take a moment to enroll today if you have not already done so. The enrollment process is free and takes just a few minutes. To enroll, please download the [place] 24/[place] to your tablet or phone, or visit [email or web address] to enroll on your computer. And, as an [address] [place] [address] patient with a riskmethods account, the results of your visits will be scanned into your electronic medical record and your primary care provider will be able to view the scanned results. We urge you to continue to see your regular [facility] provider for your ongoing medical care. And while your primary care provider may not be the one available when you seek a Neal Payne virtual visit, the peace of mind you get from getting a real diagnosis real time can be priceless.    
 
For more information on Neal Abdirashidsarwatfin, view our Frequently Asked Questions (FAQs) at [email or web address]. Sincerely, 
 
Hugo Rankin MD 
Chief Medical Officer [address] *:  certain medications cannot be prescribed via Neal Payne Providers Seen During Your Hospitalization Provider Specialty Primary office phone Laina Ryder, [address] [facility] Surgery [phone] Your Primary Care Physician (PCP) Primary Care Physician Office Phone Office Fax Shae Sosa [phone] You are allergic to the following No active allergies Recent Documentation Height Weight BMI OB Status Smoking Status 1.702 m 78.5 kg 27.12 kg/m2 Postmenopausal Never Smoker Emergency Contacts Name Discharge Info Relation Home Work Mobile LorenTalon DISCHARGE CAREGIVER [3] Spouse [3] [phone] Patient Belongings The following personal items are in your possession at time of discharge: 
  Dental Appliances: None  Visual Aid: Glasses, With patient      Home Medications: None   Jewelry: None  Clothing: Footwear, Shirt, Pants, Undergarments, Socks (with Farrah Dame)    Other Valuables: Eyeglasses (with Farrah Dame) Please provide this summary of care documentation to your next provider. Signatures-by signing, you are acknowledging that this After Visit Summary has been reviewed with you and you have received a copy. Patient Signature:  ____________________________________________________________ Date:  ____________________________________________________________  
  
Aloma Snellen Provider Signature:  ____________________________________________________________ Date:  ____________________________________________________________

## 2018-09-18 NOTE — OP NOTES
Operative Note      Patient: Oralia Laurent               Sex: female          DOA: 9/18/2018         YOB: 1955      Age:  70 y.o. Preoperative Diagnosis: CERVICAL HERNIATED NUCLEUS PULPOSIS WITH RADICULOPATHY,C4-5,C5-6,C6-7,CERVICALGIA,DISC DEGENERATION C4-5,DISC DEGENERATION C5-6,DISC DEGENERATION C6-7,OSTEOPHYTE,HYPOTHROIDISM. HX OF MIGRAINES. Postoperative Diagnosis:  CERVICAL HERNIATED NUCLEUS PULPOSIS WITH RADICULOPATHY,C4-5,C5-6,C6-7,CERVICALGIA,DISC DEGENERATION C4-5,DISC DEGENERATION C5-6,DISC DEGENERATION C6-7,OSTEOPHYTE,HYPOTHROIDISM. HX OF MIGRAINES. Surgeon: Leeanne Doshi) and Role:     * Mariana Matthews MD - Primary  Assistant: Conner Mercado PA-C    Anesthesia:  General    Procedure:  Procedure(s):  C4-7  ANTERIOR CERVICAL [address] W/C-ARM     Estimated Blood Loss: 50cc                 Implants:   Implant Name Type Inv. Item Serial No.  Lot No. LRB No. Used Action   BONE SPCR CERV LORDS 7D 5MM -- VERTIGRAFT PRESERVON - [id number]  BONE SPCR CERV LORDS 7D 5MM -- VERTIGRAFT PRESERVON [id number] [facility]  N/A 1 Implanted   BONE SPCR CERV LORDS 7D 5MM -- VERTIGRAFT PRESERVON - [id number]  BONE SPCR CERV LORDS 7D 5MM -- VERTIGRAFT PRESERVON [id number] [facility]  N/A 1 Implanted   BONE SPCR CERV LORDS 7D 6MM -- VERTIGRAFT PRESERVON - [id number]  BONE SPCR CERV LORDS 7D 6MM -- VERTIGRAFT PRESERVON [id number] [facility] TISSUE [facility]  N/A 1 Implanted   PLATE CERV ACP 3 LVL 39MM -- INVUE MAX - [id number]  PLATE CERV ACP 3 LVL 39MM -- INVUE MAX  SPINEFRONTIER [id number] N/A 1 Implanted   SCR SPNE SD INDUS 4.2X14MM -- INVUE - [id number]   SCR SPNE SD INDUS 4.2X14MM -- INVUE   SPINEFRONTIER [id number] N/A 8 Implanted       Drains: RUTH           Complications:  None              Indications: This is a 70y.o. year-old female who presents with significant neck and arm pain worsening ability to do activities of daily living.  X-rays and MRI scan revealing spinal stenosis, degenerative disk disease and disk herniation. Having failed conservative care, he comes for operative intervention. Procedure Details:   After appropriate informed consent was obtained, the patient was taken to the operating suite and placed in a supine position on the operating table. Satisfactory general endotracheal anesthesia was induced. All pressure points were carefully padded. Perioperative antibiotics were given. The anterior neck was shaved, prepped, and draped in the usual sterile fashion. Intraoperative fluoroscopy was used to localize the C4-5 level. A transverse linear incision was made in the left anterior neck directly and was carried down through the subcutaneous tissue. The platysma was divided with electrocautery in a transverse fashion and was undermined both superiorly and inferiorly. Dissection was continued down along the anterior border of the sternocleidomastoid muscle. The carotid artery was palpated and was swept laterally. A plane was identified between the paratracheal musculature and the sternocleidmastoid  into the prevertebral space and was developed both superiorly and inferiorly using blunt dissection. Intraoperative fluoroscopy and a spinal needle were used to localize theC 4-5 disc space. The prevertebral fascia was then incised longitudinally, and the longus colli muscles were swept laterally away from the bony elements of the spine on both sides. A self-retaining retractor with smooth blades was placed in the medial and lateral wound. 14 mm distraction pins were placed in the superior and inferior vertebral bodies. Next, the C4-5, C5-6 and C6-7 discs were removed completely with curettes and pituitary rongeurs. Cartilaginous endplates were removed. Posterolateral osteophytes were removed using the 2mm Kerrison rongeur.  The posterior longitudinal ligament was opened with nerve hook and generous foraminotomies were created bilaterally. The nerve roots and spinal cord were found to be freely decompressed. The wound was then irrigated copiously with antibiotic solution. Meticulous hemostasis was obtained. Osteophytes were removed from the posterior and anterior vertebral bodies with the meli. The cartilagenous endplates were also removed from each of the vertebral bodies down to bleeding subchondral bone. The interbody space were then sized for bone graft with trial sizers and bone graft then impacted into the interbody space. Each found to have a nice, snug fit. ANTERIOR INSTRUMENTATION:  Next, a SpineFrontier anterior cervical plate was placed from C4-7. Screws were then placed sequentially starting with an awl then followed by self-tapping screws. Two screws were placed in each vertebra under fluoroscopic guidance. The screws visualized to locked into the plate with the wings of the screw head snapping under the plate edge. Intraoperative fluoroscopy confirmed the entire construct to be in good position. The wound was once more copiously irrigated with antibiotic solution. The self-retaining retractor was removed from the wound. Meticulous hemostasis was obtained. The esophagus was inspected and was found to be intact. A RUTH drain was inserted. The platysma was closed using interrupted 2-0 Vicryl sutures. The skin edges were closed with 3-0 PDS suture and approximated using Dermabond. A sterile dressing was applied to the wound. The patient was then transferred back to the stretcher where satisfactory general endotracheal anesthesia was reversed. The patient was then taken to the Recovery Room in satisfactory condition.

## 2018-09-18 NOTE — PERIOP NOTES
Transported patient to room. Patient is alert and oriented with no acute distress noted. Vital signs within normal limits. Dressing clean dry and intact. No further questions from receiving nurse.

## 2018-09-19 VITALS
OXYGEN SATURATION: 100 % | HEIGHT: 67 IN | RESPIRATION RATE: 16 BRPM | SYSTOLIC BLOOD PRESSURE: 135 MMHG | WEIGHT: 173.13 LBS | HEART RATE: 75 BPM | TEMPERATURE: 98.9 F | BODY MASS INDEX: 27.17 KG/M2 | DIASTOLIC BLOOD PRESSURE: 77 MMHG

## 2018-09-19 PROBLEM — M48.02 CERVICAL SPINAL STENOSIS: Status: RESOLVED | Noted: 2018-09-10 | Resolved: 2018-09-19

## 2018-09-19 PROBLEM — M48.02 CERVICAL SPINAL STENOSIS: Status: ACTIVE | Noted: 2018-09-19

## 2018-09-19 PROBLEM — M50.20 HNP (HERNIATED NUCLEUS PULPOSUS), CERVICAL: Status: RESOLVED | Noted: 2018-09-10 | Resolved: 2018-09-19

## 2018-09-19 LAB
ERYTHROCYTE [DISTWIDTH] IN BLOOD BY AUTOMATED COUNT: 13.5 % (ref 11.6–14.5)
HCT VFR BLD AUTO: 38.6 % (ref 35–45)
HGB BLD-MCNC: 12.9 G/DL (ref 12–16)
MCH RBC QN AUTO: 30.4 PG (ref 24–34)
MCHC RBC AUTO-ENTMCNC: 33.4 G/DL (ref 31–37)
MCV RBC AUTO: 90.8 FL (ref 74–97)
PLATELET # BLD AUTO: 213 K/UL (ref 135–420)
PMV BLD AUTO: 10 FL (ref 9.2–11.8)
RBC # BLD AUTO: 4.25 M/UL (ref 4.2–5.3)
WBC # BLD AUTO: 8.4 K/UL (ref 4.6–13.2)

## 2018-09-19 PROCEDURE — G8989 SELF CARE D/C STATUS: HCPCS

## 2018-09-19 PROCEDURE — 51798 US URINE CAPACITY MEASURE: CPT

## 2018-09-19 PROCEDURE — 74011250636 HC RX REV CODE- 250/636: Performed by: ANESTHESIOLOGY

## 2018-09-19 PROCEDURE — G8988 SELF CARE GOAL STATUS: HCPCS

## 2018-09-19 PROCEDURE — 85027 COMPLETE CBC AUTOMATED: CPT | Performed by: PHYSICIAN ASSISTANT

## 2018-09-19 PROCEDURE — 97167 OT EVAL HIGH COMPLEX 60 MIN: CPT

## 2018-09-19 PROCEDURE — 36415 COLL VENOUS BLD VENIPUNCTURE: CPT | Performed by: PHYSICIAN ASSISTANT

## 2018-09-19 PROCEDURE — G8980 MOBILITY D/C STATUS: HCPCS

## 2018-09-19 PROCEDURE — G8979 MOBILITY GOAL STATUS: HCPCS

## 2018-09-19 PROCEDURE — 74011000250 HC RX REV CODE- 250

## 2018-09-19 PROCEDURE — 74011250636 HC RX REV CODE- 250/636: Performed by: PHYSICIAN ASSISTANT

## 2018-09-19 PROCEDURE — 77030011943

## 2018-09-19 PROCEDURE — 97162 PT EVAL MOD COMPLEX 30 MIN: CPT

## 2018-09-19 PROCEDURE — 97530 THERAPEUTIC ACTIVITIES: CPT

## 2018-09-19 PROCEDURE — 65390000012 HC CONDITION CODE 44 OBSERVATION

## 2018-09-19 PROCEDURE — 97535 SELF CARE MNGMENT TRAINING: CPT

## 2018-09-19 PROCEDURE — 97116 GAIT TRAINING THERAPY: CPT

## 2018-09-19 PROCEDURE — 74011250636 HC RX REV CODE- 250/636

## 2018-09-19 PROCEDURE — G8987 SELF CARE CURRENT STATUS: HCPCS

## 2018-09-19 PROCEDURE — 74011250637 HC RX REV CODE- 250/637: Performed by: PHYSICIAN ASSISTANT

## 2018-09-19 PROCEDURE — G8978 MOBILITY CURRENT STATUS: HCPCS

## 2018-09-19 RX ORDER — ONDANSETRON 4 MG/1
8 TABLET, ORALLY DISINTEGRATING ORAL
Status: DISCONTINUED | OUTPATIENT
Start: 2018-09-19 | End: 2018-09-19 | Stop reason: HOSPADM

## 2018-09-19 RX ORDER — OXYCODONE AND ACETAMINOPHEN 5; 325 MG/1; MG/1
1-1.5 TABLET ORAL
Qty: 84 TAB | Refills: 0 | Status: SHIPPED | OUTPATIENT
Start: 2018-09-19

## 2018-09-19 RX ORDER — NALOXONE HYDROCHLORIDE 2 MG/.4ML
2 INJECTION, SOLUTION INTRAMUSCULAR; SUBCUTANEOUS
Qty: 1 DEVICE | Refills: 0 | Status: SHIPPED | OUTPATIENT
Start: 2018-09-19 | End: 2018-09-19

## 2018-09-19 RX ORDER — DIAZEPAM 5 MG/1
2.5-5 TABLET ORAL
Qty: 60 TAB | Refills: 0 | Status: SHIPPED | OUTPATIENT
Start: 2018-09-19

## 2018-09-19 RX ADMIN — SODIUM CHLORIDE, SODIUM LACTATE, POTASSIUM CHLORIDE, AND CALCIUM CHLORIDE 125 ML/HR: 600; 310; 30; 20 INJECTION, SOLUTION INTRAVENOUS at 02:35

## 2018-09-19 RX ADMIN — Medication 10 ML: at 05:40

## 2018-09-19 RX ADMIN — OXYCODONE HYDROCHLORIDE AND ACETAMINOPHEN 1 TABLET: 7.5; 325 TABLET ORAL at 16:07

## 2018-09-19 RX ADMIN — CEFAZOLIN SODIUM 2 G: 2 SOLUTION INTRAVENOUS at 12:52

## 2018-09-19 RX ADMIN — DOCUSATE SODIUM 100 MG: 100 CAPSULE, LIQUID FILLED ORAL at 09:28

## 2018-09-19 RX ADMIN — LEVOTHYROXINE SODIUM 125 MCG: 125 TABLET ORAL at 06:11

## 2018-09-19 RX ADMIN — CEFAZOLIN SODIUM 2 G: 2 SOLUTION INTRAVENOUS at 03:40

## 2018-09-19 NOTE — PROGRESS NOTES
Problem: Falls - Risk of 
Goal: *Absence of Falls Document Rico Reas Fall Risk and appropriate interventions in the flowsheet. Outcome: Progressing Towards Goal 
Fall Risk Interventions: 
Mobility Interventions: Patient to call before getting OOB, Utilize walker, cane, or other assistive device Medication Interventions: Patient to call before getting OOB, Teach patient to arise slowly Elimination Interventions: Toileting schedule/hourly rounds, Call light in reach

## 2018-09-19 NOTE — PROGRESS NOTES
Transition of Care (JIMMY) Plan:     Chart reviewed, met with pt and spouse in room. Pt planning discharge home, spouse will be home to assist. [facility] offered, pt chose [facility] [phone] for follow up; referral placed with CMS. RW has been delivered to pt room by [facility]. CM answered questions regarding observation/outpt status. Virtual reviewer communicated change to CM which reflect outpatient observation order written prior to discharge order being written. CM met with the patient and provided to the patient the printed information about her outpatient observation status. The patient was given the flyer entitled, \"Medicare Outpatient Observation: Information & notification. \" All questions were answered, no additional discharge needs identified at this time and patient expects to return to their (home, assisted living facility, relatives home, etc.) after discharge today. JIMMY Transportation: How is patient being transported at discharge? Family/Friend When? Once cleared by Therapy between 12-2pm 
 
 Is transport scheduled? N/A Follow-up appointment and transportation: PCP/Specialist?  See AVS for Appointment Who is transporting to the follow-up appointment? Family/Friend Is transport for follow up appointment scheduled? N/A Communication plan (with patient/family): Who is being called? Patient or Next of Kin? Responsible party? Patient What number(s) is to be used? See [employer] What service provider is calling for [facility] services? When are they calling? 24-48 hours following discharge Readmission Risk? (Green/Low; Yellow/Moderate; Red/High):  [employer] Care Management Interventions PCP Verified by CM: Yes Transition of Care Consult (CM Consult): Home Health [facility] - INPATIENT: No 
Reason Outside [place]: Physician referred to specific agency ([facility]) Discharge Durable Medical Equipment: Yes 
 Physical Therapy Consult: Yes Occupational Therapy Consult: Yes Current Support Network: Lives with Spouse, Own Home Confirm Follow Up Transport: Family Plan discussed with Pt/Family/Caregiver: Yes Freedom of Choice Offered: Yes Discharge Location Discharge Placement: Home with home health

## 2018-09-19 NOTE — PROGRESS NOTES
0940 
Assumed care at this time. Assessment completed in flowsheet. Pt is alert and oriented x 4. Denies SOB and chest pain. Pt lungs clear bilaterally. Capillary refill less than 3 seconds. Pt has numbness to LUE. Stated pain  3/10. Pt has 18G IV to the RT AC. Pt has 4x4 and nonadherent dressing with cervical collar. SCDs applied bilaterally. Pt encouraged to continue use of IS, Pt verbalized understanding. Ice pack applied. Call light and possessions within reach. Bed is in the lowest position. Will continue to monitor. 1400 
 d/c drain and changed dressing, clean and dry [facility] Dual AVS review with KAREN MarinoRN 
43 [phone] AVS review with pt, opportunity for questions and concerns d/c IV clean and dry

## 2018-09-19 NOTE — PROGRESS NOTES
0725 
Bedside and Verbal shift change report given to Elian Fowler RN by DAFNE Torres RN. Report included the following information SBAR, Kardex, OR Summary, Intake/Output and MAR.

## 2018-09-19 NOTE — PROGRESS NOTES
Problem: Self Care Deficits Care Plan (Adult) Goal: *Acute Goals and Plan of Care (Insert Text) Initial Occupational Therapy Goals (9/19/2018) Within 7 day(s): 1. Patient will perform toilet transfer with Supervision in preparation for bowel and bladder management. 2. Patient will perform bowel and bladder management with setup for increased independence with ADLs. 3. Patient will perform UB dressing with setup (including back brace) for increased independence with ADLs. 4. Patient will perform LB dressing with setup & A/E PRN for increased independence with ADLs. 5. Patient will adhere to back/spinal precautions 100% of the time with 1-2 verbal cues for increased independence with ADLs. 6. Patient will utilize energy conservation techniques with 1 verbal cue(s) for increased independence with ADLs. 7. Patient will be independent in B hand ROM/strengthening HEP for increased independence with ADLs. Outcome: Progressing Towards Goal 
Occupational Therapy EVALUATION Patient: Dorota Mack [de-identified]70 y.o. female) Date: 9/19/2018 Primary Diagnosis: CERVICAL HERNIATED NUCLEUS PULPOSIS WITH RADICULOPATHY,C4-5,C5-6,C6-7,CERVICALGIA,DISC DEGENERATION C4-5,DISC DEGENERATION C5-6,DISC DEGENERATION C6-7,OSTEOPHYTE,HYPOTHROIDISM. HX OF MIGRAINES. Cervical spinal stenosis Cervical spinal stenosis Procedure(s) (LRB): 
C4-7  ANTERIOR CERVICAL DISC FUSION W/C-ARM (N/A) 1 Day Post-Op Precautions: L hand lack of sensation, Fall, Other (comment) (cervical collar) ASSESSMENT : 
Based on the objective data described below, the patient presents with decreased functional strength, decreased functional balance, decreased overall activity tolerance limiting independence with ADLs. Patient up in chair and requesting to return to bed d/t L hand pain and being nauseated most of morning. Pt tearful with concerns about her ability to take care of herself. Pt reports having LUE radiculopathy, but could use L hand.  Pt now w/ increased pain along Radial distribution. Pt would benefit from L wrist based splint to support thumb and wrist (?thumb spica vs. Open thumb). Pt c/o R hand CMC pain as well. Pt limited in LE dressing especially reaching feet. Educated on compensatory strategies and clothing modification. Educated on importance of moving and using LUE to assist w/ pain and sensation. Pt would benefit from continued OT services to maximize independence in ADLs. Education: Reviewed Neck/Back Precautions, Collar/Brace management, body mechanics, home safety, adaptive strategies and adaptive dressing techniques including clothing modifications with patient verbalizing understanding at this time. Patient will benefit from skilled intervention to address the above impairments. Patients rehabilitation potential is considered to be Good Factors which may influence rehabilitation potential include:  
[]             None noted [x]             Mental ability/status [x]             Medical condition [x]             Home/family situation and support systems []             Safety awareness []             Pain tolerance/management 
[]             Other: PLAN : 
Recommendations and Planned Interventions: 
[x]               Self Care Training                  [x]        Therapeutic Activities [x]               Functional Mobility Training    [x]        Cognitive Retraining 
[x]               Therapeutic Exercises           [x]        Endurance Activities [x]               Balance Training                   [x]        Neuromuscular Re-Education [x]               Visual/Perceptual Training     [x]   Home Safety Training 
[x]               Patient Education                 [x]        Family Training/Education []               Other (comment): Frequency/Duration: Patient will be followed by occupational therapy 1-2 times per day/2-4 days per week to address goals. Discharge Recommendations: Rehab and Home Health pending progress & support Further Equipment Recommendations for Discharge: To Be Determined (TBD) at next level of care SUBJECTIVE:  
Patient stated My  can't really help me and I'm concerned how I'm going to do this.  OBJECTIVE DATA SUMMARY:  
 
Past Medical History:  
Diagnosis Date  Migraine  OA (osteoarthritis)  Thyroid disease   
 hypo Past Surgical History:  
Procedure Laterality Date  HX DILATION AND CURETTAGE    
 HX GYN    
 ovarian cyst removed  HX HEENT  1980  
 wisdom teeth  HX LAP CHOLECYSTECTOMY  2001  HX ORTHOPAEDIC Left   
 shoulder manipulation under anesthesia [place] [address] Barriers to Learning/Limitations: yes;  emotional, sensory deficits-vision/hearing/speech and physical 
Compensate with: visual, verbal, tactile, kinesthetic cues/model Prior Level of Function/Home Situation: Pt lives w/ spouse and reports friends who have offered to help; pt is an avid [employer] Home Situation Home Environment: Private residence # Steps to Enter: 4 Rails to Enter: Yes Hand Rails : Bilateral 
One/Two Story Residence: Two story, live on 1st floor Living Alone: No 
Support Systems: Spouse/Significant Other/Partner Patient Expects to be Discharged to[de-identified] Private residence Current DME Used/Available at Home: Brace/Splint, Walker, rolling Tub or Shower Type: Shower [x]  Right hand dominant   []  Left hand dominant Cognitive/Behavioral Status: 
Neurologic State: Alert; Appropriate for age Orientation Level: Oriented X4 Cognition: Follows commands Safety/Judgement: Awareness of environment Skin: anterior incision w/ dressing Edema: mild edema noted Vision/Perceptual:   
 limited by c-collar Coordination: 
Coordination: Generally decreased, functional 
Fine Motor Skills-Upper: Left Impaired;Right Intact Gross Motor Skills-Upper: Left Impaired;Right Intact Balance: Sitting: Intact Standing: Intact; With support Strength: 
Strength: Generally decreased, functional 
Tone & Sensation: 
Tone: Normal 
Sensation: Impaired Range of Motion: 
AROM: Generally decreased, functional 
PROM: Generally decreased, functional 
 
Functional Mobility and Transfers for ADLs: 
Bed Mobility: 
Supine to Sit: Stand-by assistance;Supervision Sit to Supine: Stand-by assistance;Supervision Transfers: 
Sit to Stand: Contact guard assistance (vc) Bed to Chair: Contact guard assistance;Supervision Toilet Transfer : Contact guard assistance;Supervision ADL Assessment:  
Feeding: Setup;Contact guard assistance; Adaptive equipment Oral Facial Hygiene/Grooming: Contact guard assistance; Additional time; Adaptive equipment Bathing: Minimum assistance; Moderate assistance Upper Body Dressing: Minimum assistance Lower Body Dressing: Moderate assistance Toileting: Minimum assistance ADL Intervention: 
Feeding Feeding Assistance: Contact guard assistance Container Management: Minimum assistance Cutting Food: Minimum assistance Utensil Management: Contact guard assistance Food to Mouth: Contact guard assistance; Compensatory technique training Drink to Mouth: Contact guard assistance Grooming Washing Hands: Supervision/set-up (hand wipes) Upper Body Dressing Assistance Orthotics(Brace): Moderate assistance Lower Body Dressing Assistance Underpants: Moderate assistance Cognitive Retraining Problem Solving: Inductive reason; Identifying the task; Identifying the problem;General alternative solution;Deductive reason; Awareness of environment Executive Functions: Executing cognitive plans Organizing/Sequencing: Breaking task down;Prioritizing Safety/Judgement: Awareness of environment Therapeutic Exercise: Instructed on gentle hand open/close to assist w/ pain and joint stiffness d/t OA Pain: 
Pre-treatment: 7/10 Post-treatment: 5/10 Activity Tolerance: Patient able to stand 2-3 minute(s). Patient able to complete ADLs with intermittent rest breaks. Patient limited by anxiety/strength/pain/balance. Patient unsteady Please refer to the flowsheet for vital signs taken during this treatment. After treatment:  
[] Patient left in no apparent distress sitting up in chair 
[x] Patient left in no apparent distress in bed 
[x] Call bell left within reach [x] Nursing notified/Enma 
[x] Caregiver present/Jarrett/PT 
[] Bed alarm activated COMMUNICATION/EDUCATION:  
[x] Home safety education was provided and the patient/caregiver indicated understanding. [x] Patient/family have participated as able in goal setting and plan of care. [x] Patient/family agree to work toward stated goals and plan of care. [] Patient understands intent and goals of therapy, but is neutral about his/her participation. [] Patient is unable to participate in goal setting and plan of care. Thank you for this referral. 
Radha Gonzalez, OTR/L Time Calculation: 61 mins G-Codes (GP) Self Care  Current  CK= 40-59%  Goal  CI= 1-19% The severity rating is based on the professional judgement & direct observation of Level of Assistance required for Functional Mobility and ADLs. Eval Complexity: History: HIGH Complexity : Extensive review of history including physical, cognitive and psychosocial history ; Examination: HIGH Complexity : 5 or more performance deficits relating to physical, cognitive , or psychosocial skils that result in activity limitations and / or participation restrictions; Decision Making:HIGH Complexity : Patient presents with comorbidities that affect occupational performance. Signifigant modification of tasks or assistance (eg, physical or verbal) with assessment (s) is necessary to enable patient to complete evaluation

## 2018-09-19 NOTE — DISCHARGE INSTRUCTIONS
Dr. Linda Shipley Operative Instructions: Cervical Fusion    *YOU MUST AVOID SMOKING OR BEING AROUND ANYONE WHO SMOKES. AVOID ALL PRODUCTS THAT CONTAIN NICOTINE. DO NOT TAKE IBUPROFEN OR ANTI--INFLAMMATORIES, AS THESE MAY ALTER THE HEALING OF THE FUSION. Diet:   1. Begin with liquids and light foods such as jell-o or soups  2. Advance as tolerated to your regular diet if not nauseated. 3.  Swallowing difficulties may be experienced up to 2 weeks post-operatively. Modify food thickness as necessary. You may also obtain over-the-counter chloraseptic spray. Medications:  1. Strong oral narcotic pain medications have been prescribed for the first few days. Use only as directed. No pain medication is capable of taking away all the pain. Taking your pills at regular intervals will give you the best chance of having less pain. 2. If you need a refill PLEASE PLAN AHEAD. Call our office during regular hours (8-5). 3. Do not combine with alcoholic beverages. 4. Be careful as you walk, climb stairs or drive as mild dizziness is not unusual.  5. Do not take medications that have not been prescribed by your surgeon. 6. You may switch to over the counter pain medication of your choice as you become more comfortable. Activity and Restrictions:  1. You should not drive until you are clear by your surgeon at your post-operative visit. 2.  In regards to your cervical collar, you MUST wear this at all times until your first follow-up appointment. 3.  You should wear the collar even when sleeping. 4.  It can be removed for short periods of time as long as you are keeping your head centered over the shoulders without rotating or looking up or down. 5. You may take short walks inside and outside of your home and climb stairs. 6. You are to avoid work, housework, yard work, snow shoveling, lifting of more than a few pounds, overhead work or strenuous activity.   7. Avoid any excessive stretching or range-of-motion of the neck. Non-painful range-of-motion of the neck is allowed  8. Follow-up with Dr. Kieran Miller in 7-10 days. DRIVIN. You should not drive until after your follow-up appointment. 2.  You can be in a vehicle for short distances, but if you travel any long distance, please stop about every 30 minutes and walk/stretch. 3.  You should NEVER drive while taking narcotic medication. BATHING and INCISION CARE:  1. The incision may be tender to touch or feel numb: this is normal.   2.  Keep the incision clean and dry. Do not get the incision wet for 5 days. The incision will be closed with sutures under the skin and the skin will be glued. 3.  Do not apply any lotions, ointments or oils on the incision. 4.  If you notice any excessive swelling, redness, or persistent drainage around the incision, notify the office immediately. RETURN TO WORK :  1. The decision to return to work will be determined on an individual basis. 2.  Many people who have a strenuous job (construction, heavy labor, etc) may need to be off work for up to 8 weeks. 3.  If you need a work note, please let us know as soon as possible, and not the same day you are planning to return to work. NUTRITION :  1.  Good nutrition is an essential part of healing. 2.  You should eat a balanced diet each day, including fruits, vegetables, dairy products and protein. 3.  Remember to drink plenty of water. 4.  If you have not had a bowel movement within 3 days of surgery, you will need to use a laxative or suppository that can be obtained over-the-counter at your local pharmacy. BONE STIMULATOR:  1. A spinal bone stimulator may be prescribed for you under certain situations. 2.  A nurse will call you if one has been requested and discuss its use for approximately 4-6 months post-op every day. 3.  This device assists in bone healing and fusion.     CALL THE OFFICE:   If you have severe pain unrelieved by the medications, new numbness or tingling in your arms;   If you have a fever of 101.0°F or greater;    If you notice excessive swelling, redness, or persistent drainage from the incision or IV site; [facility] office number is [phone] from 8:00am to 5:00pm Monday through Friday. After 5:00pm, on weekends, or holidays, please leave a message with our answering service and the doctor on-call will get back to you shortly.

## 2018-09-19 NOTE — PROGRESS NOTES
Progress Note POD #1 Patient: Payal Pittman               Sex: female          DOA: 9/18/2018 YOB: 1955      Surgery: Procedure(s): 
C4-7  ANTERIOR CERVICAL DISC FUSION W/C-ARM         LOS:  LOS: 1 day Subjective:  
 
 C/o tc thumb pain. tc feet numbness. Left arm feels weak. N/V resolved. Objective:  
  
Visit Vitals  /67  Pulse 72  Temp 97.9 °F (36.6 °C)  Resp 17  Ht 5' 7\" (1.702 m)  Wt 78.5 kg (173 lb 2 oz)  SpO2 100%  BMI 27.12 kg/m2 Physical Exam: 
Neurological: motor strength: 3/5 left tricep otherwise 5/5 in upper and lower extremities bilaterally 
                        sensation: intact to light touch Tender  CMC joint tc thumbs, mild swelling tc hands. Intake and Output: 
Current Shift:  09/19 0701 - 09/19 1900 In: 4003.5 [I.V.:4003.5] Out: - Last three shifts:  09/17 1901 - 09/19 0700 In: 1800 [I.V.:1800] Out: 1225 [Urine:1200] Lab/Data Reviewed: 
Lab Results Component Value Date/Time WBC 8.4 09/19/2018 03:28 AM  
 HGB 12.9 09/19/2018 03:28 AM  
 HCT 38.6 09/19/2018 03:28 AM  
 PLATELET 157 60/50/3792 03:28 AM  
 MCV 90.8 09/19/2018 03:28 AM  
 
No results found for: APTT No results found for: INR, PTMR, PTP, PT1, PT2 Assessment/Plan Principal Problem: 
  Cervical spinal stenosis (9/10/2018) Active Problems: 
  DDD (degenerative disc disease), cervical (9/10/2018) HNP (herniated nucleus pulposus), cervical (9/10/2018) 1. Stable 2. D/C PCA 3. D/C home after cleared by PT 
4. F/U at [facility] in 10 days 5. Change dressing- apply telfa & opsite. 6. D/C drain 7. Tc thumb pain-  Secondary to intraoperative tc UE traction. Ice prn to tc. Thumbs. Should resolve with time. 8. Will continue to monitor LUE symptoms as outpatient.

## 2018-09-19 NOTE — PHYSICIAN ADVISORY
Letter of admission status determination Lisa Morocho Age: 70 y.o. MRN: [id number] Admitting physician:   
Insurance: Payor: Sharon Perish / Plan: [place] MEDICARE [place] & B / Product Type: Medicare /  
 
Date of admission:  9/18/2018 I have reviewed this case as it involves a Medicare patient not meeting criteria for Inpatient status. The patient underwent an elective procedure yesterday. The procedure is not on the current Medicare Inpatient Only List. The patient tolerated the procedure well, without any documented complications, was monitored overnight and has remained stable. There were no unexpected complications to warrant Observation services or Inpatient status. Routine postoperative recovery in outpatient status can be overnight. Therefore, Outpatient status without Observation services is appropriate. The final decision regarding the patient's hospitalization status depends on the attending physician's judgment. Nestor Hedrick MD, PATRICK, FACP, [facility] Physician Advisor [address] [place] [address]. 
[phone] September 19, 2018   9:32 AM

## 2018-09-19 NOTE — PROGRESS NOTES
Problem: Mobility Impaired (Adult and Pediatric) Goal: *Acute Goals and Plan of Care (Insert Text) Physical Therapy Goals Initiated 9/19/2018  to be met within 1 days STG's: 
1. Bed mobility:  Supine to/from sit with S or better in prep for ADL activity. 2. Transfers:  Sit to/from stand with S or better in prep for gait. 3. Tolerate sitting up in chair >30min for functional activity performance. LTG's 1. Ambulation:  Ambulate 100+ ft.with S/RW for home mobility at discharge. 2. Step Negotiation: Ascend/Descend 3-5 steps with CGA/min assist with HR for home navigation as indicated. Outcome: Progressing Towards Goal 
physical Therapy EVALUATION Patient: Melissa Taylor [de-identified]70 y.o. female) Date: 9/19/2018 Primary Diagnosis: CERVICAL HERNIATED NUCLEUS PULPOSIS WITH RADICULOPATHY,C4-5,C5-6,C6-7,CERVICALGIA,DISC DEGENERATION C4-5,DISC DEGENERATION C5-6,DISC DEGENERATION C6-7,OSTEOPHYTE,HYPOTHROIDISM. HX OF MIGRAINES. Cervical spinal stenosis Cervical spinal stenosis Procedure(s) (LRB): 
C4-7  ANTERIOR CERVICAL DISC FUSION W/C-ARM (N/A) 1 Day Post-Op Precautions:Fall, Other (comment) (cervical collar) ASSESSMENT : 
Based on the objective data described below, the patient is a 71 yo F s/p surgery as noted above. Pt presents with limitations in strength/sensation bilateral hands (see OT note for details), and decreased independence in functional mobility with regard to bed mobility, transfers, and gait. Patient reports pain 3-4/10 pre/post treatment. Pt upright in bed, wearing cervical collar, pulse oximeter in place and IV intact. Demonstrates supine to/from sit with supervision/SBA, and transfers with CGA. Pt able to participate in GT/RW/CGA 66ft with slow, reciprocal gt pattern. Pt demonstrates sufficient  for RW management L/R. Reports moderate fatigue post gt. Pt returned to room andleft back in bed with HOB elevated >60deg and spouse present.   All needs in reach, SCD's in place and nurse Enma notified. Will continue in pm for further GT and stair instruction. Recommend HHPT for follow up physical therapy upon discharge to reach maximal level of independence/safety with functional mobility. Pt Education: pt educated in safety/technique during functional mobility tasks Patient will benefit from skilled intervention to address the above impairments. Patients rehabilitation potential is considered to be Good Factors which may influence rehabilitation potential include:  
[]         None noted 
[]         Mental ability/status [x]         Medical condition 
[]         Home/family situation and support systems 
[]         Safety awareness 
[]         Pain tolerance/management 
[]         Other: PLAN : 
Recommendations and Planned Interventions: 
[]           Bed Mobility Training             []    Neuromuscular Re-Education []           Transfer Training                   []    Orthotic/Prosthetic Training 
[]           Gait Training                          []    Modalities []           Therapeutic Exercises          []    Edema Management/Control 
[]           Therapeutic Activities            []    Patient and Family Training/Education 
[]           Other (comment): Frequency/Duration: Patient will be followed by physical therapy twice daily to address goals. Discharge Recommendations: Home Health Further Equipment Recommendations for Discharge: N/A  
 
SUBJECTIVE:  
Patient stated Tripp Crane just about carried me to the bathroom last night.  OBJECTIVE DATA SUMMARY:  
 
Past Medical History:  
Diagnosis Date  Migraine  OA (osteoarthritis)  Thyroid disease   
 hypo Past Surgical History:  
Procedure Laterality Date  HX DILATION AND CURETTAGE    
 HX GYN    
 ovarian cyst removed  HX HEENT  1980  
 wisdom teeth  HX LAP CHOLECYSTECTOMY  2001  HX ORTHOPAEDIC Left   
 shoulder manipulation under anesthesia [address] Barriers to Learning/Limitations: yes;  physical 
Compensate with: Visual Cues, Verbal Cues and Tactile Cues Prior Level of Function/Home Situation: pt reports independent functional mobility PTA Home Situation Home Environment: Private residence # Steps to Enter: 4 Rails to Enter: Yes Hand Rails : Bilateral 
One/Two Story Residence: Two story, live on 1st floor Living Alone: No 
Support Systems: Spouse/Significant Other/Partner Patient Expects to be Discharged to[de-identified] Private residence Current DME Used/Available at Home: Brace/Splint, Walker, rolling Tub or Shower Type: ShowerCritical Behavior: 
Neurologic State: Alert; Appropriate for age Orientation Level: Oriented X4 Cognition: Follows commands Safety/Judgement: Awareness of environment Psychosocial 
Patient Behaviors: Calm; Cooperative Needs Expressed: Educational;Emotional 
Purposeful Interaction: Yes Pt Identified Daily Priority: Clinical issues (comment) [facility] Process: Nurture loving kindness;Enable addie/hope;Establish trust;Nurture spiritual self;Teaching/learning; Attend basic human needs;Create healing environment Caring Interventions: Reassure Reassure: Therapeutic listening; Informing; Acceptance; Instilling addie and hopeSkin Condition/Temp: Dry;Warm 
Skin Integrity: Incision (comment) Skin Integumentary Skin Color: Appropriate for ethnicity Skin Condition/Temp: Dry;Warm 
Skin Integrity: Incision (comment) Turgor: Non-tenting Hair Growth: Present Varicosities: Absent Strength:   
Strength: Generally decreased, functional (see OT notes re: UE's) Tone & Sensation:  
Tone: Normal 
Sensation: Impaired (UE's; intact LE's ) Range Of Motion: 
AROM: Generally decreased, functional (see OT note re:UE's; LE's WFL) PROM: Generally decreased, functional 
Functional Mobility: 
Bed Mobility: 
Supine to Sit: Stand-by assistance;Supervision Sit to Supine: Stand-by assistance;Supervision Transfers: Sit to Stand: Contact guard assistance (vc) Stand to Sit: Contact guard assistance; Other (comment) (vc) Bed to Chair: Contact guard assistance;Supervision Balance:  
Sitting: Intact Standing: Intact; With supportAmbulation/Gait Training: 
Distance (ft): 66 Feet (ft) Assistive Device: Brace/Splint;Gait belt;Walker, rolling Ambulation - Level of Assistance: Contact guard assistance Gait Description (WDL): Exceptions to [facility] Gait Abnormalities: Decreased step clearance Base of Support: Narrowed Speed/Becca: Slow Step Length: Right shortened;Left shortened Interventions: Safety awareness training;Verbal cues Pain: 
Pain Scale 1: Numeric (0 - 10) Pain Intensity 1: 4 Pain Location 1: Hand Pain Orientation 1: Left;Right (Left more than left) Pain Description 1: Numb Pain Intervention(s) 1: Encouraged PCA Activity Tolerance:  
Fair Please refer to the flowsheet for vital signs taken during this treatment. After treatment:  
[]         Patient left in no apparent distress sitting up in chair 
[x]         Patient left in no apparent distress in bed 
[x]         Call bell left within reach [x]         Nursing notified - Enma 
[x]         Caregiver present-spouse 
[]         Bed alarm activated COMMUNICATION/EDUCATION:  
[x]         Fall prevention education was provided and the patient/caregiver indicated understanding. [x]         Patient/family have participated as able in goal setting and plan of care. [x]         Patient/family agree to work toward stated goals and plan of care. []         Patient understands intent and goals of therapy, but is neutral about his/her participation. []         Patient is unable to participate in goal setting and plan of care.  
 
Eval Complexity: History: HIGH Complexity :3+ comorbidities / personal factors will impact the outcome/ POC Exam:MEDIUM Complexity : 3 Standardized tests and measures addressing body structure, function, activity limitation and / or participation in recreation  Presentation: MEDIUM Complexity : Evolving with changing characteristics  Clinical Decision Making:Medium Complexity amb with RW/CGA >30ft with cervical collar in place Overall Complexity:MEDIUM 
 
G-Codes (GP) Mobility  Current  CI= 1-19%   Goal  CI= 1-19% The severity rating is based on the functional mobility assessment. Thank you for this referral. 
Goldie Pineda, PT Time Calculation: 34 mins

## 2018-09-19 NOTE — PROGRESS NOTES
1942 Assumed care of pt at this time. Assessment complete. Pt is alert and oriented x 4, but is drowsy. Denies SOB and chest pain. Pt lungs clear bilaterally. Respiratory rate is 8 at this time. Capillary refill less than 3 seconds. Pt is experiencing numbness in LUE and numbness and pain in R thumb Stated pain 3/10. Pt has 18 g IV to R AC. SCD's and TEDs applied to BLE. Pt verbalized understanding. Call light and possessions within reach. Bed in lowest position. Will continue to monitor. 2030  Pt is experiencing nausea and vomitting at this time. Pt's gown and underpad changed. 2316 Pt is experiencing nausea and vomitting at this time. Pt's gown changed. Administered ondansetron (ZOFRAN) 4mg for nausea and vomiting. 9711 Bladder scan performed. 818 mL of urine shown. 5907 Straight cath inserted. 8809 Straight cath removed. 1200 mL of urine removed. 7409 Bladder scanned pt at this time. Pt retaining over 859 mL of urine. Bladder scanner printer not working at this time. 1225 Assisted pt to the bathroom. Two person assist. Pt urinating on her own. Shift summary: Pt is alert and oriented x 4. Pt had an uneventful shift. Pain controlled by PCA pump.

## 2018-09-19 NOTE — ROUTINE PROCESS
Bedside and verbal shift change report given to Mayank Washington RN (oncoming nurse) by Dong Caldera RN (offgoing nurse). Report included the following information: SBAR, Kardex, MAR, and recent results.

## 2018-09-19 NOTE — PROGRESS NOTES
Problem: Mobility Impaired (Adult and Pediatric) Goal: *Acute Goals and Plan of Care (Insert Text) Physical Therapy Goals Initiated 9/19/2018  to be met within 1 days STG's: 
1. Bed mobility:  Supine to/from sit with S or better in prep for ADL activity. 2. Transfers:  Sit to/from stand with S or better in prep for gait. 3. Tolerate sitting up in chair >30min for functional activity performance. LTG's 1. Ambulation:  Ambulate 100+ ft.with S/RW for home mobility at discharge. 2. Step Negotiation: Ascend/Descend 3-5 steps with CGA/min assist with HR for home navigation as indicated. Outcome: Resolved/Met Date Met: 09/19/18 physical Therapy TREATMENT/DISCHARGE Patient: Ian Johnson [de-identified]70 y.o. female) Date: 9/19/2018 Diagnosis: CERVICAL HERNIATED NUCLEUS PULPOSIS WITH RADICULOPATHY,C4-5,C5-6,C6-7,CERVICALGIA,DISC DEGENERATION C4-5,DISC DEGENERATION C5-6,DISC DEGENERATION C6-7,OSTEOPHYTE,HYPOTHROIDISM. HX OF MIGRAINES. Cervical spinal stenosis Cervical spinal stenosis Cervical spinal stenosis Procedure(s) (LRB): 
C4-7  ANTERIOR CERVICAL DISC FUSION W/C-ARM (N/A) 1 Day Post-Op Precautions: Fall, Other (comment) (cervical collar) Chart, physical therapy assessment, plan of care and goals were reviewed. ASSESSMENT: 
Pt reports pain in (B) hands, L>R and (B) feet feel \"weird. \"  Pt ambulated 160ft with RW, reciprocal gt pattern, steady slow pace, no LOB or path deviations. Safely negotiated 5 steps holding (B) hand rails. EDUCATION sleeping position, stair nego tech Progression toward goals: 
[x]      Goals met 
[]      Improving appropriately and progressing toward goals 
[]      Improving slowly and progressing toward goals 
[]      Not making progress toward goals and plan of care will be adjusted PLAN: 
Patient will be discharged from physical therapy at this time. Rationale for discharge: 
[x] Goals Achieved 
[] [address] 
[] Patient not participating in therapy 
[] Other: Discharge Recommendations:  Home Health Further Equipment Recommendations for Discharge:  RW delivered SUBJECTIVE:  
Patient stated Jayesh Herd my hands hurt.  OBJECTIVE DATA SUMMARY:  
 
G CODES: Mobility  [id number] Goal  CI= 1-19%  D/C  CI= 1-19%. The severity rating is based on the Level of Assistance required for Functional Mobility and ADLs. Critical Behavior: 
Neurologic State: Alert, Appropriate for age Orientation Level: Oriented X4 Cognition: Follows commands Safety/Judgement: Awareness of environment Functional Mobility Training: 
Bed Mobility: 
Supine to Sit: Supervision Sit to Supine: Supervision Transfers: 
Sit to Stand: Supervision Stand to Sit: Supervision Bed to Chair: Contact guard assistance;Supervision Balance: 
Sitting: Intact Standing: Intact; With support Ambulation/Gait Training: 
Distance (ft): 160 Feet (ft) Assistive Device: Brace/Splint;Gait belt;Walker, rolling Ambulation - Level of Assistance: Supervision Gait Description (WDL): Exceptions to [facility] Gait Abnormalities: Antalgic;Decreased step clearance Base of Support: Narrowed Speed/Becca: Slow Step Length: Right shortened;Left shortened Interventions: Safety awareness training;Verbal cues Stairs: 
Number of Stairs Trained: 5 Stairs - Level of Assistance: Supervision Rail Use: Both 
Pain: 
Pre treatment pain:  4 Post treatment pain:  4 Pain Scale 1: Numeric (0 - 10) Pain Location 1: Hand Pain Orientation 1: Left;Right (Left more than left) Pain Description 1: Numb Activity Tolerance:  
Good Please refer to the flowsheet for vital signs taken during this treatment. After treatment:  
[] Patient left in no apparent distress sitting up in chair 
[x] Patient left in no apparent distress in bed 
[x] Call bell left within reach [x] Nursing notified 
[] Caregiver present 
[] Bed alarm activated Olivia Ronquilol PTA Time Calculation: 23 mins

## (undated) DEVICE — STERILE POLYISOPRENE POWDER-FREE SURGICAL GLOVES: Brand: PROTEXIS

## (undated) DEVICE — SUTURE VCRL + SZ 2-0 L18IN ABSRB VLT CT-2 1/2 CIR TAPERCUT VCP726D

## (undated) DEVICE — APPLICATOR BNDG 1MM ADH PREMIERPRO EXOFIN

## (undated) DEVICE — Device

## (undated) DEVICE — SCREW SPNL 14 MM DISTRCTN

## (undated) DEVICE — REM POLYHESIVE ADULT PATIENT RETURN ELECTRODE: Brand: VALLEYLAB

## (undated) DEVICE — DRAIN SURG L49IN DIA3/32IN 10IN H SIL W/O TRCR END PERF

## (undated) DEVICE — 3.0MM PRECISION NEURO (MATCH HEAD)

## (undated) DEVICE — PREP SKN PREVAIL 40ML APPL --

## (undated) DEVICE — 1010 S-DRAPE TOWEL DRAPE 10/BX: Brand: STERI-DRAPE™

## (undated) DEVICE — PIN TEMP FIX FOR SCREW

## (undated) DEVICE — KENDALL SCD EXPRESS SLEEVES, KNEE LENGTH, MEDIUM: Brand: KENDALL SCD

## (undated) DEVICE — HALTER TRACTION HD W/ TRI COTTON LINING FOAM LTX

## (undated) DEVICE — SOL IRRIGATION INJ NACL 0.9% 500ML BTL

## (undated) DEVICE — INSULATED BLADE ELECTRODE: Brand: EDGE

## (undated) DEVICE — 4-PORT MANIFOLD: Brand: NEPTUNE 2

## (undated) DEVICE — 3M™ TEGADERM™ TRANSPARENT FILM DRESSING FRAME STYLE, 1626W, 4 IN X 4-3/4 IN (10 CM X 12 CM), 50/CT 4CT/CASE: Brand: 3M™ TEGADERM™

## (undated) DEVICE — PACK PROCEDURE SURG ANTR CERV DISCECTOMY

## (undated) DEVICE — SUTURE PROL SZ 3-0 L18IN NONABSORBABLE BLU L19MM PC-5 3/8 8632G

## (undated) DEVICE — LIMB HOLDER, WRIST/ANKLE: Brand: DEROYAL